# Patient Record
Sex: MALE | Race: WHITE | NOT HISPANIC OR LATINO | Employment: FULL TIME | URBAN - METROPOLITAN AREA
[De-identification: names, ages, dates, MRNs, and addresses within clinical notes are randomized per-mention and may not be internally consistent; named-entity substitution may affect disease eponyms.]

---

## 2024-06-18 ENCOUNTER — TELEPHONE (OUTPATIENT)
Dept: SPORTS MEDICINE | Facility: CLINIC | Age: 59
End: 2024-06-18

## 2024-06-18 ENCOUNTER — OFFICE VISIT (OUTPATIENT)
Dept: SPORTS MEDICINE | Facility: CLINIC | Age: 59
End: 2024-06-18
Payer: COMMERCIAL

## 2024-06-18 ENCOUNTER — HOSPITAL ENCOUNTER (OUTPATIENT)
Facility: HOSPITAL | Age: 59
Discharge: HOME | End: 2024-06-18
Admitting: STUDENT IN AN ORGANIZED HEALTH CARE EDUCATION/TRAINING PROGRAM
Payer: COMMERCIAL

## 2024-06-18 VITALS
BODY MASS INDEX: 30.36 KG/M2 | HEIGHT: 69 IN | OXYGEN SATURATION: 95 % | SYSTOLIC BLOOD PRESSURE: 132 MMHG | TEMPERATURE: 97.8 F | WEIGHT: 205 LBS | DIASTOLIC BLOOD PRESSURE: 85 MMHG | HEART RATE: 79 BPM

## 2024-06-18 DIAGNOSIS — S86.011A ACHILLES RUPTURE, RIGHT, INITIAL ENCOUNTER: Primary | ICD-10-CM

## 2024-06-18 DIAGNOSIS — S86.011A ACHILLES RUPTURE, RIGHT, INITIAL ENCOUNTER: ICD-10-CM

## 2024-06-18 PROCEDURE — 99203 OFFICE O/P NEW LOW 30 MIN: CPT | Performed by: STUDENT IN AN ORGANIZED HEALTH CARE EDUCATION/TRAINING PROGRAM

## 2024-06-18 PROCEDURE — 73718 MRI LOWER EXTREMITY W/O DYE: CPT

## 2024-06-18 RX ORDER — RIVAROXABAN 10 MG/1
10 TABLET, FILM COATED ORAL
COMMUNITY
Start: 2024-05-17

## 2024-06-18 NOTE — PROGRESS NOTES
"Chief Complaint   Patient presents with    Right Ankle - Initial Evaluation    Pain     Right calf       History of Present Illness  Zhao is a 58 y.o. 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   Pain is currently rated 5/10 and described as a throbbing. Feels like \"a pulled muscle\".   Pain is located on the posterior aspect of the distal calf, but no pain \"in the Achilles\".   Admits to associated stiffness and giving way stating he doesn't feel stable on it.   Denies any numbness or tingling.   Pain worsens with activity, such as walking and stairs.   Has been managing symptoms with ice and took ibuprofen 400 mg after it happened. (Of note, he is on chronic Xarelto for history of PE and factor V liden.)  Denies any previous injury or occurrence.   History of previous L ACL repair in 2008.       The following data was reviewed by: Elsi Mcrae DO on 06/18/2024:  Data reviewed :        Lab Results   Component Value Date    BUN 19 12/15/2022    CREATININE 1.07 12/15/2022    BCR 17.9 12/15/2022    K 4.2 12/15/2022    CO2 28 12/15/2022    CALCIUM 9.1 12/15/2022    ALBUMIN 4.3 05/17/2022    BILITOT 0.4 05/17/2022    AST 26 05/17/2022    ALT 41 05/17/2022         Review of Systems   All other systems reviewed and are negative.      /85 (BP Location: Right arm, Patient Position: Sitting, Cuff Size: Adult)   Pulse 79   Temp 97.8 °F (36.6 °C) (Infrared)   Ht 175.3 cm (69\")   Wt 93 kg (205 lb)   SpO2 95%   BMI 30.27 kg/m²        Physical Exam  Vital signs reviewed.   General: Well developed, well nourished; No acute distress.  Eyes: conjunctiva clear; pupils equally round and reactive  ENT: external ears and nose atraumatic; hearing normal  CV: no peripheral edema, 2+ distal pulses  Resp: normal respiratory " effort, no use of accessory muscles  Skin: normal color and pigmentation; no rashes or wounds; normal turgor  Psych: alert and oriented; mood and affect appropriate; recent and remote memory intact  Neuro: sensation to light touch intact    MSK Exam:  The right ankle is without obvious signs of acute bony deformity, swelling, erythema or ecchymosis.  There is slight increase in circumference throughout the left lower extremity compared to the right, though he states this is chronic since his DVT on the left many years ago.  No area of focal swelling or bruising along the right lower leg.  The distal Achilles feels intact. However, more proximally at the site of the proximal Achilles/musculotendinous junction there is a palpable defect with loss of muscle/tendon tension. Area feels soft with local swelling/hematoma and there is associated tenderness in this area as well.  There is also tenderness that continues proximally along the medial gastrocnemius.  No significant tenderness of the lateral head of the gastroc.  Positive Wong's test.      Assessment and Plan  Diagnoses and all orders for this visit:    1. Achilles rupture, right, initial encounter (Primary)  -     MRI Tibia Fibula Right Without Contrast; Future    Zhao is a 58 y.o. 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 History and exam consistent with proximal Achilles rupture. I recommended MRI to further evaluate the extent of the injury, however based on physical exam findings it is likely that this is a complete rupture.  We discussed surgical and conservative treatment options, including general recovery protocols and risks of rerupture.  Given that he is very active, he is leaning towards surgical repair if necessary.  We will await MRI results and  refer to orthopedic surgery if appropriate.  For now, I recommended supportive management.  He was provided an Ace wrap for gentle compression.  I recommended ice and elevation above the level of his heart to help with pain and swelling.  He should remain nonweightbearing with use of the crutches, which were adjusted to an appropriate height.  He should avoid NSAIDs given his medical history and chronic use of Xarelto, but may use Tylenol 1000 mg every 8 hours as needed.  We discussed concerning signs and symptoms that would warrant more urgent follow-up. We will follow-up after MRI additional treatment recommendations accordinglyis obtained and make . All of his questions were answered and he is agreeable with the plan.    Dictated utilizing Dragon dictation.

## 2024-06-18 NOTE — LETTER
June 18, 2024     Patient: Zhao Yeh   YOB: 1965   Date of Visit: 6/18/2024       To Whom It May Concern:    It is my medical opinion that Zhao Yeh may return to work on 6/18/2024 . However, he sustained a fairly significant injury that will require additional time away from work as it continues to be evaluated. Please excuse him from any time away. He will be following-up either with myself or other providers and we will make additional recommendations accordingly.            Sincerely,        Elsi Mcrae DO    CC: No Recipients

## 2024-06-18 NOTE — TELEPHONE ENCOUNTER
Hub staff attempted to follow warm transfer process and was unsuccessful     Caller: PATIENT    Relationship to patient: SELF    Best call back number: [phone]    Patient is needing: PATIENT WAS RETURNING A MISSED CALL FROM YOUR OFFICE REGARDING HIS MRI APPT TODAY. PATIENT WAS CALLING TO LET YOUR OFFICE KNOW HE WILL BE AT HIS MRI APPT. THANK YOU!

## 2024-06-20 ENCOUNTER — TELEPHONE (OUTPATIENT)
Dept: ORTHOPEDIC SURGERY | Facility: CLINIC | Age: 59
End: 2024-06-20
Payer: COMMERCIAL

## 2024-06-20 NOTE — TELEPHONE ENCOUNTER
Received a referral from Dr. Mcrae on patient for right achilles rupture.      Called pt and scheduled him with Dr. Ty for Monday, 6/24 @ 9:30am. I did let patient know that Dr. Ty wanted him to continue with a boot, double heel lifts and to remain partial weightbearing only.    Patient does not have a boot or lifts so he is going to come in office to get fitted with those today and will see Dr. Ty on Monday.

## 2024-06-21 ENCOUNTER — PATIENT ROUNDING (BHMG ONLY) (OUTPATIENT)
Dept: SPORTS MEDICINE | Facility: CLINIC | Age: 59
End: 2024-06-21
Payer: COMMERCIAL

## 2024-06-21 NOTE — PROGRESS NOTES
June 21, 2024      A [employer] Message has been sent to the patient for PATIENT ROUNDING with [facility]

## 2024-06-24 ENCOUNTER — OFFICE VISIT (OUTPATIENT)
Dept: ORTHOPEDIC SURGERY | Facility: CLINIC | Age: 59
End: 2024-06-24
Payer: COMMERCIAL

## 2024-06-24 VITALS — WEIGHT: 205 LBS | TEMPERATURE: 97.1 F | HEIGHT: 69 IN | BODY MASS INDEX: 30.36 KG/M2

## 2024-06-24 DIAGNOSIS — S86.011A PARTIAL TEAR OF RIGHT ACHILLES TENDON, INITIAL ENCOUNTER: Primary | ICD-10-CM

## 2024-06-24 DIAGNOSIS — S86.111A RUPTURE OF RIGHT GASTROCNEMIUS TENDON, INITIAL ENCOUNTER: ICD-10-CM

## 2024-06-24 DIAGNOSIS — R52 PAIN: ICD-10-CM

## 2024-06-24 PROCEDURE — 99204 OFFICE O/P NEW MOD 45 MIN: CPT | Performed by: ORTHOPAEDIC SURGERY

## 2024-06-24 NOTE — PROGRESS NOTES
New Patient Complaint      Patient: Zhao Yeh  YOB: 1965 58 y.o. male  Medical Record Number: [id number]    Chief Complaints: I hurt my Achilles    History of Present Illness: Patient injured his right Achilles on 6/18/2024.  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  He was seen by Dr. Mcrae that day and sent for MRI for evaluation of Achilles rupture.        Patient is seen today at the request of Dr. Mcrae who has requested my opinion regarding etiology and treatment of this condition    HPI    Allergies: No Known Allergies    Medications:   Current Outpatient Medications on File Prior to Visit   Medication Sig    Xarelto 10 MG tablet Take 1 tablet by mouth Daily With Dinner.     No current facility-administered medications on file prior to visit.       Past Medical History:   Diagnosis Date    Acromioclavicular separation 2003    Arthritis of back 2010    Osgood-Schlatter's disease      Past Surgical History:   Procedure Laterality Date    HAND SURGERY  1980    Right wrist Cyst removal    KNEE SURGERY  2007    Left ACL reconstruction     Social History     Occupational History    Not on file   Tobacco Use    Smoking status: Never    Smokeless tobacco: Never   Vaping Use    Vaping status: Never Used   Substance and Sexual Activity    Alcohol use: Not Currently     Alcohol/week: 4.0 standard drinks of alcohol     Types: 4 Drinks containing 0.5 oz of alcohol per week    Drug use: Never    Sexual activity: Yes     Partners: Female     Birth control/protection: Vasectomy      Social History     Social History Narrative    Not on file     No family history on file.    Review of Systems: 14 point review of systems performed, positive pertinent findings identified in HPI. All remaining systems negative     Review of Systems      Physical Exam:   Vitals:    06/24/24 0927   Temp: 97.1 °F (36.2 °C)  "  Weight: 93 kg (205 lb)   Height: 175.3 cm (69\")   PainSc:   3     Physical Exam   Constitutional: pleasant, well developed   Eyes: sclera non icteric  Hearing : adequate for exam  Cardiovascular: palpable pulses in right foot, right calf/ thigh NT without sign of DVT  Respiratoy: breathing unlabored   Neurological: grossly sensate to LT throughout right LE  Psychiatric: oriented with normal mood and affect.   Lymphatic: No palpable popliteal lymphadenopathy right LE  Skin: intact throughout right leg/foot  Musculoskeletal: On exam he has moderate tenderness and some swelling to the right calf and proximal Achilles area.  There is tenderness to palpation in this area and some of a palpable defect over the proximal medial aspect of the Achilles.  Does have increased resting length on the right compared to the left and no heel motion with Wong testing.  Physical Exam  Ortho Exam    Radiology:      2 views 2 views of the right heel ordered evaluate pain reviewed and no prior x-rays available for comparison no obvious fracture about the right heel.  There is a mildly prominent superior calcaneal tuberosity but no obvious calcifications or fracture.    MRI films and report of the right tib-fib dated 6/18/2024 showed partial tear of the medial head of the gastroc tendon at the gastrocsoleus junction distally this as well as anterior gastroc aponeurosis and comes contiguous with a tear of the posterior soleus aponeurosis..  Also continues with a transverse tear of the Achilles tendon origin/myotendinous junction and the proximal Achilles tendon fibers are discontinuous.  The mid to distal Achilles tendon is intact distal to the soleus.  There is retraction medial head of the gastroc and soleus muscle.  Tenderness and.  Mild tenderness edema/hemorrhage    Assessment/Plan: 1.  Right medial gastroc tear with proximal Achilles tendon tear    We discussed operative and nonoperative treatment options and not sure if this " would be amenable to repair given its proximity and patient reports that he would like to be considered for surgery if at all possible and if so to have that done with the limited exposure technique that he has seen online    Reviewed with him that he may or may not be a candidate for surgical treatment but could be given the area of defect that he has that I do not have familiarity with the percutaneous technique.    Will refer him to see Dr. Lees for his evaluation regarding treatment of this.  I spoke with Dr. Lees's assistant Regi who is going to contact the patient about getting in to see them this week.  Patient was fitted with a boot with double heel lift may do partial weightbearing on this.  He was provided with copies of his x-rays as well as MRI report and Dr. Lees's name and number and referral was placed in [facility].  He will let me know is proceeding once he sees him.

## 2024-06-26 ENCOUNTER — TELEPHONE (OUTPATIENT)
Dept: ORTHOPEDIC SURGERY | Facility: CLINIC | Age: 59
End: 2024-06-26
Payer: COMMERCIAL

## 2024-06-27 ENCOUNTER — TRANSCRIBE ORDERS (OUTPATIENT)
Dept: ADMINISTRATIVE | Facility: HOSPITAL | Age: 59
End: 2024-06-27
Payer: COMMERCIAL

## 2024-06-27 ENCOUNTER — HOSPITAL ENCOUNTER (OUTPATIENT)
Dept: CARDIOLOGY | Facility: HOSPITAL | Age: 59
Discharge: HOME | End: 2024-06-27
Payer: COMMERCIAL

## 2024-06-27 ENCOUNTER — LAB (OUTPATIENT)
Dept: LAB | Facility: HOSPITAL | Age: 59
End: 2024-06-27
Payer: COMMERCIAL

## 2024-06-27 DIAGNOSIS — S86.091A OTHER SPECIFIED INJURY OF RIGHT ACHILLES TENDON, INITIAL ENCOUNTER: ICD-10-CM

## 2024-06-27 DIAGNOSIS — S86.091A OTHER SPECIFIED INJURY OF RIGHT ACHILLES TENDON, INITIAL ENCOUNTER: Primary | ICD-10-CM

## 2024-06-27 LAB
ANION GAP SERPL CALCULATED.3IONS-SCNC: 10 MMOL/L (ref 5–15)
BASOPHILS # BLD AUTO: 0.04 10*3/MM3 (ref 0–0.2)
BASOPHILS NFR BLD AUTO: 0.5 % (ref 0–1.5)
BUN SERPL-MCNC: 15 MG/DL (ref 6–20)
BUN/CREAT SERPL: 14.7 (ref 7–25)
CALCIUM SPEC-SCNC: 9.6 MG/DL (ref 8.6–10.5)
CHLORIDE SERPL-SCNC: 104 MMOL/L (ref 98–107)
CO2 SERPL-SCNC: 26 MMOL/L (ref 22–29)
CREAT SERPL-MCNC: 1.02 MG/DL (ref 0.76–1.27)
DEPRECATED RDW RBC AUTO: 41.6 FL (ref 37–54)
EGFRCR SERPLBLD CKD-EPI 2021: 85.7 ML/MIN/1.73
EOSINOPHIL # BLD AUTO: 0.07 10*3/MM3 (ref 0–0.4)
EOSINOPHIL NFR BLD AUTO: 0.9 % (ref 0.3–6.2)
ERYTHROCYTE [DISTWIDTH] IN BLOOD BY AUTOMATED COUNT: 13 % (ref 12.3–15.4)
GLUCOSE SERPL-MCNC: 90 MG/DL (ref 65–99)
HCT VFR BLD AUTO: 45.7 % (ref 37.5–51)
HGB BLD-MCNC: 15.2 G/DL (ref 13–17.7)
IMM GRANULOCYTES # BLD AUTO: 0.01 10*3/MM3 (ref 0–0.05)
IMM GRANULOCYTES NFR BLD AUTO: 0.1 % (ref 0–0.5)
LYMPHOCYTES # BLD AUTO: 2.98 10*3/MM3 (ref 0.7–3.1)
LYMPHOCYTES NFR BLD AUTO: 37.5 % (ref 19.6–45.3)
MCH RBC QN AUTO: 29.1 PG (ref 26.6–33)
MCHC RBC AUTO-ENTMCNC: 33.3 G/DL (ref 31.5–35.7)
MCV RBC AUTO: 87.5 FL (ref 79–97)
MONOCYTES # BLD AUTO: 0.67 10*3/MM3 (ref 0.1–0.9)
MONOCYTES NFR BLD AUTO: 8.4 % (ref 5–12)
NEUTROPHILS NFR BLD AUTO: 4.18 10*3/MM3 (ref 1.7–7)
NEUTROPHILS NFR BLD AUTO: 52.6 % (ref 42.7–76)
NRBC BLD AUTO-RTO: 0 /100 WBC (ref 0–0.2)
PLATELET # BLD AUTO: 171 10*3/MM3 (ref 140–450)
PMV BLD AUTO: 9.8 FL (ref 6–12)
POTASSIUM SERPL-SCNC: 4.2 MMOL/L (ref 3.5–5.2)
QT INTERVAL: 426 MS
QTC INTERVAL: 415 MS
RBC # BLD AUTO: 5.22 10*6/MM3 (ref 4.14–5.8)
SODIUM SERPL-SCNC: 140 MMOL/L (ref 136–145)
WBC NRBC COR # BLD AUTO: 7.95 10*3/MM3 (ref 3.4–10.8)

## 2024-06-27 PROCEDURE — 80048 BASIC METABOLIC PNL TOTAL CA: CPT

## 2024-06-27 PROCEDURE — 93005 ELECTROCARDIOGRAM TRACING: CPT | Performed by: ANESTHESIOLOGY

## 2024-06-27 PROCEDURE — 85025 COMPLETE CBC W/AUTO DIFF WBC: CPT

## 2024-06-27 PROCEDURE — 36415 COLL VENOUS BLD VENIPUNCTURE: CPT

## 2024-06-28 ENCOUNTER — LAB (OUTPATIENT)
Dept: LAB | Facility: HOSPITAL | Age: 59
End: 2024-06-28
Payer: COMMERCIAL

## 2024-06-28 ENCOUNTER — PATIENT ROUNDING (BHMG ONLY) (OUTPATIENT)
Dept: ORTHOPEDIC SURGERY | Facility: CLINIC | Age: 59
End: 2024-06-28
Payer: COMMERCIAL

## 2024-06-28 ENCOUNTER — CONSULT (OUTPATIENT)
Dept: ONCOLOGY | Facility: CLINIC | Age: 59
End: 2024-06-28
Payer: COMMERCIAL

## 2024-06-28 VITALS
HEART RATE: 57 BPM | SYSTOLIC BLOOD PRESSURE: 132 MMHG | TEMPERATURE: 97.6 F | BODY MASS INDEX: 31.36 KG/M2 | OXYGEN SATURATION: 98 % | HEIGHT: 69 IN | DIASTOLIC BLOOD PRESSURE: 76 MMHG | RESPIRATION RATE: 16 BRPM | WEIGHT: 211.7 LBS

## 2024-06-28 DIAGNOSIS — R79.89 ABNORMAL CBC: Primary | ICD-10-CM

## 2024-06-28 DIAGNOSIS — Z86.711 HISTORY OF PULMONARY EMBOLISM: Primary | ICD-10-CM

## 2024-06-28 DIAGNOSIS — D68.51 FACTOR V LEIDEN MUTATION: ICD-10-CM

## 2024-06-28 DIAGNOSIS — Z86.711 HISTORY OF PULMONARY EMBOLISM: ICD-10-CM

## 2024-06-28 LAB
BASOPHILS # BLD AUTO: 0.04 10*3/MM3 (ref 0–0.2)
BASOPHILS NFR BLD AUTO: 0.5 % (ref 0–1.5)
D DIMER PPP FEU-MCNC: 0.72 MCGFEU/ML (ref 0–0.57)
DEPRECATED RDW RBC AUTO: 39.7 FL (ref 37–54)
EOSINOPHIL # BLD AUTO: 0.08 10*3/MM3 (ref 0–0.4)
EOSINOPHIL NFR BLD AUTO: 1 % (ref 0.3–6.2)
ERYTHROCYTE [DISTWIDTH] IN BLOOD BY AUTOMATED COUNT: 12.6 % (ref 12.3–15.4)
HCT VFR BLD AUTO: 45.8 % (ref 37.5–51)
HGB BLD-MCNC: 15.8 G/DL (ref 13–17.7)
IMM GRANULOCYTES # BLD AUTO: 0.09 10*3/MM3 (ref 0–0.05)
IMM GRANULOCYTES NFR BLD AUTO: 1.1 % (ref 0–0.5)
LYMPHOCYTES # BLD AUTO: 2.57 10*3/MM3 (ref 0.7–3.1)
LYMPHOCYTES NFR BLD AUTO: 31.7 % (ref 19.6–45.3)
MCH RBC QN AUTO: 30 PG (ref 26.6–33)
MCHC RBC AUTO-ENTMCNC: 34.5 G/DL (ref 31.5–35.7)
MCV RBC AUTO: 86.9 FL (ref 79–97)
MONOCYTES # BLD AUTO: 0.66 10*3/MM3 (ref 0.1–0.9)
MONOCYTES NFR BLD AUTO: 8.1 % (ref 5–12)
NEUTROPHILS NFR BLD AUTO: 4.68 10*3/MM3 (ref 1.7–7)
NEUTROPHILS NFR BLD AUTO: 57.6 % (ref 42.7–76)
NRBC BLD AUTO-RTO: 0 /100 WBC (ref 0–0.2)
PLATELET # BLD AUTO: 145 10*3/MM3 (ref 140–450)
PMV BLD AUTO: 10.3 FL (ref 6–12)
RBC # BLD AUTO: 5.27 10*6/MM3 (ref 4.14–5.8)
WBC NRBC COR # BLD AUTO: 8.12 10*3/MM3 (ref 3.4–10.8)

## 2024-06-28 PROCEDURE — 85379 FIBRIN DEGRADATION QUANT: CPT | Performed by: INTERNAL MEDICINE

## 2024-06-28 PROCEDURE — 85025 COMPLETE CBC W/AUTO DIFF WBC: CPT

## 2024-06-28 NOTE — PROGRESS NOTES
[facility]    CONSULTING IN BLOOD DISORDERS & CANCER      REASON FOR CONSULTATION/CHIEF COMPLAINT:     Evaluation and management for history of DVT PE and factor V Leiden mutation                             REQUESTING PHYSICIAN: Max Lees Jr., MD  RECORDS OBTAINED:  Records of the patients history including those from the electronic medical record were reviewed and summarized in detail.    HISTORY OF PRESENT ILLNESS:    The patient is a 58 y.o. year old male with medical history significant for history of DVTs/PE and factor V Leiden mutation comes for perioperative anticoagulation evaluation and management prior to right Achilles tendon rupture repair surgery.    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  Further workup noted right Achilles tendon rupture.  He has seen Dr. Lees, orthopedic surgery who plans to perform surgical repair on 7/3/2024.    Patient has been referred to hematology clinic to formulate perioperative anticoagulation plan in view of history of DVT/PE and factor V Leiden mutation.    Patient states he had his first episode of LLE DVT after a long flight from [place] in a year 2005.  He took anticoagulation with warfarin for around 6 months and then stopped.  Patient had another episode of LLE DVT and PE after undergoing left ACL tear surgery in 2006.  Patient states he underwent hypercoagulable workup at that time and was found to have factor V Leiden mutation.  He is unsure about heterozygous vs homozygous mutation, although he was told it was a lower risk mutation.  He was resumed back on warfarin which she took until 2021 when seen by Dr. Orr, hematologist with [facility] who switched him to Xarelto.  The dose was changed to prophylactic dose of Xarelto 10 mg daily.    Patient has stayed on Xarelto 10 mg daily since then.  Denies any bleeding or excessive bruises.  Family history is significant for maternal grandmother and sister with  "history of blood clots.      Past Medical History:   Diagnosis Date    Acromioclavicular separation 2003    Arthritis of back 2010    Osgood-Schlatter's disease      Past Surgical History:   Procedure Laterality Date    HAND SURGERY  1980    Right wrist Cyst removal    KNEE SURGERY  2007    Left ACL reconstruction       MEDICATIONS    Current Outpatient Medications:     Xarelto 10 MG tablet, Take 1 tablet by mouth Daily With Dinner., Disp: , Rfl:     ALLERGIES:   No Known Allergies    SOCIAL HISTORY:       Social History     Socioeconomic History    Marital status:    Tobacco Use    Smoking status: Never    Smokeless tobacco: Never   Vaping Use    Vaping status: Never Used   Substance and Sexual Activity    Alcohol use: Not Currently     Alcohol/week: 4.0 standard drinks of alcohol     Types: 4 Drinks containing 0.5 oz of alcohol per week    Drug use: Never    Sexual activity: Yes     Partners: Female     Birth control/protection: Vasectomy         FAMILY HISTORY:  No family history on file.  As per HPI    REVIEW OF SYSTEMS:  As per HPI       Vitals:    06/28/24 1241   BP: 132/76   Pulse: 57   Resp: 16   Temp: 97.6 °F (36.4 °C)   TempSrc: Oral   SpO2: 98%   Weight: 96 kg (211 lb 11.2 oz)   Height: 175.3 cm (69.02\")   PainSc:   2   PainLoc: Foot         6/28/2024    12:45 PM   Current Status   ECOG score 1      PHYSICAL EXAM:    CONSTITUTIONAL:  Vital signs reviewed.  No distress, looks comfortable.  EYES:  Conjunctiva and lids unremarkable.   EARS,NOSE,MOUTH,THROAT:  Ears and nose appear unremarkable.  Lips, teeth, gums appear unremarkable.  RESPIRATORY:  Normal respiratory effort.  Lungs clear to auscultation bilaterally.  CARDIOVASCULAR:  Normal S1, S2.  No murmurs rubs or gallops.  No significant lower extremity edema.  GASTROINTESTINAL: Abdomen appears unremarkable.  Nondistended   LYMPHATIC:  No cervical, supraclavicular lymphadenopathy.  NEURO: AAO no focal deficits.  Appears to have equal strength all " 4 extremities.  MUSCULOSKELETAL:  Unremarkable digits/nails.  No cyanosis or clubbing.  No apparent joint deformities.  Right foot in a brace  SKIN:  Warm.  No rashes.  PSYCHIATRIC:  Normal judgment and insight.  Normal mood and affect.     RECENT LABS:        Lab on 06/28/2024   Component Date Value Ref Range Status    WBC 06/28/2024 8.12  3.40 - 10.80 10*3/mm3 Final    RBC 06/28/2024 5.27  4.14 - 5.80 10*6/mm3 Final    Hemoglobin 06/28/2024 15.8  13.0 - 17.7 g/dL Final    Hematocrit 06/28/2024 45.8  37.5 - 51.0 % Final    MCV 06/28/2024 86.9  79.0 - 97.0 fL Final    MCH 06/28/2024 30.0  26.6 - 33.0 pg Final    MCHC 06/28/2024 34.5  31.5 - 35.7 g/dL Final    RDW 06/28/2024 12.6  12.3 - 15.4 % Final    RDW-SD 06/28/2024 39.7  37.0 - 54.0 fl Final    MPV 06/28/2024 10.3  6.0 - 12.0 fL Final    Platelets 06/28/2024 145  140 - 450 10*3/mm3 Final    Neutrophil % 06/28/2024 57.6  42.7 - 76.0 % Final    Lymphocyte % 06/28/2024 31.7  19.6 - 45.3 % Final    Monocyte % 06/28/2024 8.1  5.0 - 12.0 % Final    Eosinophil % 06/28/2024 1.0  0.3 - 6.2 % Final    Basophil % 06/28/2024 0.5  0.0 - 1.5 % Final    Immature Grans % 06/28/2024 1.1 (H)  0.0 - 0.5 % Final    Neutrophils, Absolute 06/28/2024 4.68  1.70 - 7.00 10*3/mm3 Final    Lymphocytes, Absolute 06/28/2024 2.57  0.70 - 3.10 10*3/mm3 Final    Monocytes, Absolute 06/28/2024 0.66  0.10 - 0.90 10*3/mm3 Final    Eosinophils, Absolute 06/28/2024 0.08  0.00 - 0.40 10*3/mm3 Final    Basophils, Absolute 06/28/2024 0.04  0.00 - 0.20 10*3/mm3 Final    Immature Grans, Absolute 06/28/2024 0.09 (H)  0.00 - 0.05 10*3/mm3 Final    nRBC 06/28/2024 0.0  0.0 - 0.2 /100 WBC Final   Hospital Outpatient Visit on 06/27/2024   Component Date Value Ref Range Status    QT Interval 06/27/2024 426  ms Final    QTC Interval 06/27/2024 415  ms Final   Lab on 06/27/2024   Component Date Value Ref Range Status    Glucose 06/27/2024 90  65 - 99 mg/dL Final    BUN 06/27/2024 15  6 - 20 mg/dL Final     Creatinine 06/27/2024 1.02  0.76 - 1.27 mg/dL Final    Sodium 06/27/2024 140  136 - 145 mmol/L Final    Potassium 06/27/2024 4.2  3.5 - 5.2 mmol/L Final    Chloride 06/27/2024 104  98 - 107 mmol/L Final    CO2 06/27/2024 26.0  22.0 - 29.0 mmol/L Final    Calcium 06/27/2024 9.6  8.6 - 10.5 mg/dL Final    BUN/Creatinine Ratio 06/27/2024 14.7  7.0 - 25.0 Final    Anion Gap 06/27/2024 10.0  5.0 - 15.0 mmol/L Final    eGFR 06/27/2024 85.7  >60.0 mL/min/1.73 Final    WBC 06/27/2024 7.95  3.40 - 10.80 10*3/mm3 Final    RBC 06/27/2024 5.22  4.14 - 5.80 10*6/mm3 Final    Hemoglobin 06/27/2024 15.2  13.0 - 17.7 g/dL Final    Hematocrit 06/27/2024 45.7  37.5 - 51.0 % Final    MCV 06/27/2024 87.5  79.0 - 97.0 fL Final    MCH 06/27/2024 29.1  26.6 - 33.0 pg Final    MCHC 06/27/2024 33.3  31.5 - 35.7 g/dL Final    RDW 06/27/2024 13.0  12.3 - 15.4 % Final    RDW-SD 06/27/2024 41.6  37.0 - 54.0 fl Final    MPV 06/27/2024 9.8  6.0 - 12.0 fL Final    Platelets 06/27/2024 171  140 - 450 10*3/mm3 Final    Neutrophil % 06/27/2024 52.6  42.7 - 76.0 % Final    Lymphocyte % 06/27/2024 37.5  19.6 - 45.3 % Final    Monocyte % 06/27/2024 8.4  5.0 - 12.0 % Final    Eosinophil % 06/27/2024 0.9  0.3 - 6.2 % Final    Basophil % 06/27/2024 0.5  0.0 - 1.5 % Final    Immature Grans % 06/27/2024 0.1  0.0 - 0.5 % Final    Neutrophils, Absolute 06/27/2024 4.18  1.70 - 7.00 10*3/mm3 Final    Lymphocytes, Absolute 06/27/2024 2.98  0.70 - 3.10 10*3/mm3 Final    Monocytes, Absolute 06/27/2024 0.67  0.10 - 0.90 10*3/mm3 Final    Eosinophils, Absolute 06/27/2024 0.07  0.00 - 0.40 10*3/mm3 Final    Basophils, Absolute 06/27/2024 0.04  0.00 - 0.20 10*3/mm3 Final    Immature Grans, Absolute 06/27/2024 0.01  0.00 - 0.05 10*3/mm3 Final    nRBC 06/27/2024 0.0  0.0 - 0.2 /100 WBC Final       ASSESSMENT:  Patient is a pleasant 58-year-old male with medical history significant for history of DVTs/PE and factor V Leiden mutation comes for perioperative anticoagulation  evaluation and management prior to right Achilles tendon rupture repair surgery.    # History of DVT/PE in the setting of factor V Leiden (? heterozygous) mutation:  Patient states he had his first episode of LLE DVT after a long flight from [place] in a year 2005.  He took anticoagulation with warfarin for around 6 months and then stopped.    Patient had another episode of LLE DVT and PE after undergoing left ACL tear surgery in 2006.  Patient states he underwent hypercoagulable workup at that time and was found to have factor V Leiden mutation.  He is unsure about heterozygous vs homozygous mutation, although he was told it was a lower risk mutation.   He was resumed back on warfarin which she took until 2021 when seen by Dr. Orr, hematologist with [facility] who switched him to Xarelto 10 mg daily.  Patient has stayed on Xarelto 10 mg daily since then.  Denies any bleeding or excessive bruises.  Family history is significant for maternal grandmother and sister with history of blood clots.  No current symptoms of DVT/PE.  Plan made to continue Xarelto for now    # Perioperative anticoagulation management:  Given recurrent DVTs/PE and underlying factor V Leiden mutation, he is at high risk of recurrent VTE's.  Advised patient to increase the dose of Xarelto to 20 mg daily and stop 3 days prior to surgery.  He does not need bridging anticoagulation as last episode of thrombosis was more than 15 years ago.  Patient should resume Xarelto 20 mg daily once cleared to start anticoagulation by his surgical team.  Will see him back in 4 weeks for reevaluation.  If does well, consider reducing the dose back to Xarelto 10 mg daily.    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  Further workup noted right Achilles tendon rupture.    He has seen Dr. Lees, orthopedic surgery who plans to perform surgical repair on 7/3/2024.  Anticoagulation  management as above    PLAN:   - Increase the dose of Xarelto to 20 mg daily and stop 3 days prior to surgery.    - He does not need bridging anticoagulation as last episode of thrombosis was more than 15 years ago.  - Patient should resume Xarelto 20 mg daily once cleared to start anticoagulation by his surgical team.  -Check D-dimer today  -Follow-up in 4 weeks or sooner if needed    Orders Placed This Encounter   Procedures    D-dimer, Quantitative     Standing Status:   Future     Number of Occurrences:   1     Standing Expiration Date:   6/28/2025     Order Specific Question:   Release to patient     Answer:   Routine Release [[id number]]    D-dimer, Quantitative     Standing Status:   Future     Standing Expiration Date:   6/28/2025     Order Specific Question:   Release to patient     Answer:   Routine Release [[id number]]    CBC & Differential     Standing Status:   Future     Standing Expiration Date:   6/28/2025     Order Specific Question:   Manual Differential     Answer:   No     Order Specific Question:   Release to patient     Answer:   Routine Release [[id number]]   Total time spent during this patient encounter is 65 minutes. The total time spent with the patient includes: preparing to see the patient by reviewing of tests, prior notes or other relevant information, performing appropriate independent examination & evaluation, counseling, ordering of medications, tests or procedures, documenting clinic information in the electronic medical records or other health records, independently interpreting results of tests and communicating the results to the patient/family or caregiver.

## 2024-06-28 NOTE — PROGRESS NOTES
A [employer] Message has been sent to the patient for PATIENT ROUNDING with [facility]

## 2024-07-01 ENCOUNTER — PATIENT ROUNDING (BHMG ONLY) (OUTPATIENT)
Dept: ONCOLOGY | Facility: CLINIC | Age: 59
End: 2024-07-01
Payer: COMMERCIAL

## 2024-07-01 ENCOUNTER — PATIENT MESSAGE (OUTPATIENT)
Dept: ONCOLOGY | Facility: CLINIC | Age: 59
End: 2024-07-01
Payer: COMMERCIAL

## 2024-07-05 ENCOUNTER — TELEPHONE (OUTPATIENT)
Dept: ONCOLOGY | Facility: CLINIC | Age: 59
End: 2024-07-05
Payer: COMMERCIAL

## 2024-07-05 NOTE — TELEPHONE ENCOUNTER
Caller: Zhao Yeh    Relationship: Self    Best call back number: [phone]    What is the best time to reach you: ANYTIME    Who are you requesting to speak with (clinical staff, provider,  specific staff member): CLINICAL    What was the call regarding: PATIENT NEEDS FOR YOU TO SEND A NEW SCRIPT FOR XARELTO HE IS SUPPOSE TO TAKE 20MG FOR 4 WEEKS.    SEND TO: [address]    CALL TO DISCUSS

## 2024-07-05 NOTE — TELEPHONE ENCOUNTER
Caller: Zhao Yeh    Relationship: Self    Best call back number: [phone]     What is the best time to reach you: ASAP    Who are you requesting to speak with (clinical staff, provider,  specific staff member): TARIQ      What was the call regarding: PATIENT IS RETURNING TARIQ'S CALL ABOUT THE XARELTO, HUB UNABLE TO WARM TRANSFER.  PLEASE CALL PATIENT BACK WHEN AVAILABLE. PATIENT SAYS HE HAS ALREADY HAD HIS SURGERY, ON JULY 3RD.

## 2024-08-01 ENCOUNTER — OFFICE VISIT (OUTPATIENT)
Dept: ONCOLOGY | Facility: CLINIC | Age: 59
End: 2024-08-01
Payer: COMMERCIAL

## 2024-08-01 ENCOUNTER — LAB (OUTPATIENT)
Dept: LAB | Facility: HOSPITAL | Age: 59
End: 2024-08-01
Payer: COMMERCIAL

## 2024-08-01 VITALS
OXYGEN SATURATION: 97 % | HEIGHT: 69 IN | RESPIRATION RATE: 17 BRPM | SYSTOLIC BLOOD PRESSURE: 140 MMHG | BODY MASS INDEX: 30.21 KG/M2 | TEMPERATURE: 97.8 F | HEART RATE: 84 BPM | DIASTOLIC BLOOD PRESSURE: 80 MMHG | WEIGHT: 204 LBS

## 2024-08-01 DIAGNOSIS — Z86.711 HISTORY OF PULMONARY EMBOLISM: ICD-10-CM

## 2024-08-01 DIAGNOSIS — D68.51 FACTOR V LEIDEN MUTATION: ICD-10-CM

## 2024-08-01 DIAGNOSIS — Z86.711 HISTORY OF PULMONARY EMBOLISM: Primary | ICD-10-CM

## 2024-08-01 LAB
BASOPHILS # BLD AUTO: 0.04 10*3/MM3 (ref 0–0.2)
BASOPHILS NFR BLD AUTO: 0.6 % (ref 0–1.5)
D DIMER PPP FEU-MCNC: 19.28 MCGFEU/ML (ref 0–0.57)
DEPRECATED RDW RBC AUTO: 40.5 FL (ref 37–54)
EOSINOPHIL # BLD AUTO: 0.11 10*3/MM3 (ref 0–0.4)
EOSINOPHIL NFR BLD AUTO: 1.5 % (ref 0.3–6.2)
ERYTHROCYTE [DISTWIDTH] IN BLOOD BY AUTOMATED COUNT: 12.7 % (ref 12.3–15.4)
HCT VFR BLD AUTO: 44.5 % (ref 37.5–51)
HGB BLD-MCNC: 15.3 G/DL (ref 13–17.7)
IMM GRANULOCYTES # BLD AUTO: 0.01 10*3/MM3 (ref 0–0.05)
IMM GRANULOCYTES NFR BLD AUTO: 0.1 % (ref 0–0.5)
LYMPHOCYTES # BLD AUTO: 2.63 10*3/MM3 (ref 0.7–3.1)
LYMPHOCYTES NFR BLD AUTO: 36.2 % (ref 19.6–45.3)
MCH RBC QN AUTO: 30.1 PG (ref 26.6–33)
MCHC RBC AUTO-ENTMCNC: 34.4 G/DL (ref 31.5–35.7)
MCV RBC AUTO: 87.4 FL (ref 79–97)
MONOCYTES # BLD AUTO: 0.56 10*3/MM3 (ref 0.1–0.9)
MONOCYTES NFR BLD AUTO: 7.7 % (ref 5–12)
NEUTROPHILS NFR BLD AUTO: 3.91 10*3/MM3 (ref 1.7–7)
NEUTROPHILS NFR BLD AUTO: 53.9 % (ref 42.7–76)
NRBC BLD AUTO-RTO: 0 /100 WBC (ref 0–0.2)
PLATELET # BLD AUTO: 151 10*3/MM3 (ref 140–450)
PMV BLD AUTO: 10.5 FL (ref 6–12)
RBC # BLD AUTO: 5.09 10*6/MM3 (ref 4.14–5.8)
WBC NRBC COR # BLD AUTO: 7.26 10*3/MM3 (ref 3.4–10.8)

## 2024-08-01 PROCEDURE — 85379 FIBRIN DEGRADATION QUANT: CPT | Performed by: INTERNAL MEDICINE

## 2024-08-01 PROCEDURE — 36415 COLL VENOUS BLD VENIPUNCTURE: CPT

## 2024-08-01 PROCEDURE — 85025 COMPLETE CBC W/AUTO DIFF WBC: CPT

## 2024-08-01 NOTE — PROGRESS NOTES
[facility]    CONSULTING IN BLOOD DISORDERS & CANCER      REASON FOR CONSULTATION/CHIEF COMPLAINT:     Evaluation and management for history of DVT PE and factor V Leiden mutation                             REQUESTING PHYSICIAN: No ref. provider found  RECORDS OBTAINED:  Records of the patients history including those from the electronic medical record were reviewed and summarized in detail.    HISTORY OF PRESENT ILLNESS:    The patient is a 58 y.o. year old male with medical history significant for history of DVTs/PE and factor V Leiden mutation comes for perioperative anticoagulation evaluation and management prior to right Achilles tendon rupture repair surgery.    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  Further workup noted right Achilles tendon rupture.  He has seen Dr. Lees, orthopedic surgery who plans to perform surgical repair on 7/3/2024.    Patient has been referred to hematology clinic to formulate perioperative anticoagulation plan in view of history of DVT/PE and factor V Leiden mutation.    Patient states he had his first episode of LLE DVT after a long flight from [place] in a year 2005.  He took anticoagulation with warfarin for around 6 months and then stopped.  Patient had another episode of LLE DVT and PE after undergoing left ACL tear surgery in 2006.  Patient states he underwent hypercoagulable workup at that time and was found to have factor V Leiden mutation.  He is unsure about heterozygous vs homozygous mutation, although he was told it was a lower risk mutation.  He was resumed back on warfarin which she took until 2021 when seen by Dr. Orr, hematologist with [facility] who switched him to Xarelto.  The dose was changed to prophylactic dose of Xarelto 10 mg daily.    Patient has stayed on Xarelto 10 mg daily since then.  Denies any bleeding or excessive bruises.  Family history is significant for maternal grandmother and sister with  "history of blood clots.    INTERIM HISTORY:  Patient returns to the clinic for a follow-up visit.      July 2024: Patient underwent right Achilles tendon repair surgery on 7/3/2024.  Uneventful postoperative course.  Patient had increased the dose of Xarelto to 20 mg daily prior to surgery and stopped 3 days before.  He resumed Xarelto 20 mg daily a day after surgery.  He has stayed on the same dose for now.  Denies any bleeding or bruises.  Right lower extremity still in a cast.  Planning to get cast removed next week.  After that, he will get a boot and then a shoe.  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    Past Medical History:   Diagnosis Date    Acromioclavicular separation 2003    Arthritis of back 2010    Osgood-Schlatter's disease      Past Surgical History:   Procedure Laterality Date    HAND SURGERY  1980    Right wrist Cyst removal    KNEE SURGERY  2007    Left ACL reconstruction       MEDICATIONS    Current Outpatient Medications:     rivaroxaban (XARELTO) 20 MG tablet, Take 1 tablet by mouth Daily., Disp: 60 tablet, Rfl: 0    ALLERGIES:   No Known Allergies    SOCIAL HISTORY:       Social History     Socioeconomic History    Marital status:    Tobacco Use    Smoking status: Never    Smokeless tobacco: Never   Vaping Use    Vaping status: Never Used   Substance and Sexual Activity    Alcohol use: Not Currently     Alcohol/week: 4.0 standard drinks of alcohol     Types: 4 Drinks containing 0.5 oz of alcohol per week    Drug use: Never    Sexual activity: Yes     Partners: Female     Birth control/protection: Vasectomy         FAMILY HISTORY:  No family history on file.  As per HPI    REVIEW OF SYSTEMS:  As per HPI       Vitals:    08/01/24 1142   BP: 140/80   Pulse: 84   Resp: 17   Temp: 97.8 °F (36.6 °C)   TempSrc: Oral   SpO2: 97%   Weight: 92.5 kg (204 lb)   Height: 175.3 cm (69.02\")   PainSc: 0-No pain         8/1/2024    11:43 AM   Current Status   ECOG score 0      " PHYSICAL EXAM:    CONSTITUTIONAL:  Vital signs reviewed.  No distress, looks comfortable.  EYES:  Conjunctiva and lids unremarkable.   EARS,NOSE,MOUTH,THROAT:  Ears and nose appear unremarkable.  Lips, teeth, gums appear unremarkable.  RESPIRATORY:  Normal respiratory effort.  Lungs clear to auscultation bilaterally.  CARDIOVASCULAR:  Normal S1, S2.  No murmurs rubs or gallops.  No significant lower extremity edema.  GASTROINTESTINAL: Abdomen appears unremarkable.  Nondistended   LYMPHATIC:  No cervical, supraclavicular lymphadenopathy.  NEURO: AAO no focal deficits.  Appears to have equal strength all 4 extremities.  MUSCULOSKELETAL:  Unremarkable digits/nails.  No cyanosis or clubbing.  No apparent joint deformities.  Right foot in a cast  SKIN:  Warm.  No rashes.  PSYCHIATRIC:  Normal judgment and insight.  Normal mood and affect.     RECENT LABS:        Lab on 08/01/2024   Component Date Value Ref Range Status    D-Dimer, Quantitative 08/01/2024 19.28 (H)  0.00 - 0.57 MCGFEU/mL Final    WBC 08/01/2024 7.26  3.40 - 10.80 10*3/mm3 Final    RBC 08/01/2024 5.09  4.14 - 5.80 10*6/mm3 Final    Hemoglobin 08/01/2024 15.3  13.0 - 17.7 g/dL Final    Hematocrit 08/01/2024 44.5  37.5 - 51.0 % Final    MCV 08/01/2024 87.4  79.0 - 97.0 fL Final    MCH 08/01/2024 30.1  26.6 - 33.0 pg Final    MCHC 08/01/2024 34.4  31.5 - 35.7 g/dL Final    RDW 08/01/2024 12.7  12.3 - 15.4 % Final    RDW-SD 08/01/2024 40.5  37.0 - 54.0 fl Final    MPV 08/01/2024 10.5  6.0 - 12.0 fL Final    Platelets 08/01/2024 151  140 - 450 10*3/mm3 Final    Neutrophil % 08/01/2024 53.9  42.7 - 76.0 % Final    Lymphocyte % 08/01/2024 36.2  19.6 - 45.3 % Final    Monocyte % 08/01/2024 7.7  5.0 - 12.0 % Final    Eosinophil % 08/01/2024 1.5  0.3 - 6.2 % Final    Basophil % 08/01/2024 0.6  0.0 - 1.5 % Final    Immature Grans % 08/01/2024 0.1  0.0 - 0.5 % Final    Neutrophils, Absolute 08/01/2024 3.91  1.70 - 7.00 10*3/mm3 Final    Lymphocytes, Absolute  08/01/2024 2.63  0.70 - 3.10 10*3/mm3 Final    Monocytes, Absolute 08/01/2024 0.56  0.10 - 0.90 10*3/mm3 Final    Eosinophils, Absolute 08/01/2024 0.11  0.00 - 0.40 10*3/mm3 Final    Basophils, Absolute 08/01/2024 0.04  0.00 - 0.20 10*3/mm3 Final    Immature Grans, Absolute 08/01/2024 0.01  0.00 - 0.05 10*3/mm3 Final    nRBC 08/01/2024 0.0  0.0 - 0.2 /100 WBC Final       ASSESSMENT:  Patient is a pleasant 58-year-old male with medical history significant for history of DVTs/PE and factor V Leiden mutation comes for perioperative anticoagulation evaluation and management prior to right Achilles tendon rupture repair surgery.    # History of DVT/PE in the setting of factor V Leiden (? heterozygous) mutation:  Patient states he had his first episode of LLE DVT after a long flight from [place] in year 2005.  He took anticoagulation with warfarin for around 6 months and then stopped.    Patient had another episode of LLE DVT and PE after undergoing left ACL tear surgery in 2006.  Patient states he underwent hypercoagulable workup at that time and was found to have factor V Leiden mutation.  He is unsure about heterozygous vs homozygous mutation, although he was told it was a lower risk mutation.   He was resumed back on warfarin which she took until 2021 when seen by Dr. Orr, hematologist with [facility] who switched him to Xarelto 10 mg daily.  Patient has stayed on Xarelto 10 mg daily since then.  Denies any bleeding or excessive bruises.  Family history is significant for maternal grandmother and sister with history of blood clots.  No current symptoms of DVT/PE.    7/3/2024: Patient underwent right Achilles tendon repair.  Uneventful postoperative course.  8/1/2024: Patient had increased the dose of Xarelto to 20 mg daily immediately before and after the right Achilles tendon repair surgery.  Tolerating it well.  No bleeding/bruises.  D-dimer levels from today elevated at 19.28.  This is likely due to recent  surgery, however due to high risk for VTE's-will continue Xarelto 20 mg daily for another 2 months (until end of September 2024).  Reevaluate after that.  Patient is agreeable    # Perioperative anticoagulation management:  Given recurrent DVTs/PE and underlying factor V Leiden mutation, he is at high risk of recurrent VTE's.  Patient took increased dose of Xarelto 20 mg daily perioperatively.  No bridge anticoagulation.  8/1/2024: Continue Xarelto 20 mg a daily for now.    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  Further workup noted right Achilles tendon rupture.    He was seen Dr. Lees, orthopedic surgery who performed surgical repair on 7/3/2024.  Anticoagulation management as above    PLAN:   -Continue Xarelto 20 mg daily  -Check D-dimer at follow-up  -Postoperative care per orthopedic surgery  -Follow-up in 8 weeks or sooner if needed    Orders Placed This Encounter   Procedures    Comprehensive Metabolic Panel     Standing Status:   Future     Standing Expiration Date:   8/1/2025     Order Specific Question:   Release to patient     Answer:   Routine Release [[id number]]    D-dimer, Quantitative     Standing Status:   Future     Standing Expiration Date:   8/1/2025     Order Specific Question:   Release to patient     Answer:   Routine Release [[id number]]    CBC & Differential     Standing Status:   Future     Standing Expiration Date:   8/1/2025     Order Specific Question:   Manual Differential     Answer:   No     Order Specific Question:   Release to patient     Answer:   Routine Release [[id number]]   Total time spent during this patient encounter is 43 minutes. The total time spent with the patient includes: preparing to see the patient by reviewing of tests, prior notes or other relevant information, performing appropriate independent examination & evaluation, counseling, ordering of medications, tests or procedures,  documenting clinic information in the electronic medical records or other health records, independently interpreting results of tests and communicating the results to the patient/family or caregiver.

## 2024-09-17 ENCOUNTER — TELEPHONE (OUTPATIENT)
Dept: ONCOLOGY | Facility: CLINIC | Age: 59
End: 2024-09-17
Payer: COMMERCIAL

## 2024-09-18 ENCOUNTER — TELEPHONE (OUTPATIENT)
Dept: ONCOLOGY | Facility: CLINIC | Age: 59
End: 2024-09-18

## 2024-09-18 NOTE — TELEPHONE ENCOUNTER
Caller: Zhao Yeh    Relationship: Self    Best call back number: [phone]    What is the best time to reach you: ANYTIME    Who are you requesting to speak with (clinical staff, provider,  specific staff member): SCHEDULING     What was the call regarding: PT RETURNING MISSED CALL FROM ROBER FOR RESCHEDULING 9-27

## 2024-10-01 ENCOUNTER — LAB (OUTPATIENT)
Dept: LAB | Facility: HOSPITAL | Age: 59
End: 2024-10-01
Payer: COMMERCIAL

## 2024-10-01 ENCOUNTER — OFFICE VISIT (OUTPATIENT)
Dept: ONCOLOGY | Facility: CLINIC | Age: 59
End: 2024-10-01
Payer: COMMERCIAL

## 2024-10-01 VITALS
BODY MASS INDEX: 31.5 KG/M2 | DIASTOLIC BLOOD PRESSURE: 86 MMHG | OXYGEN SATURATION: 97 % | RESPIRATION RATE: 16 BRPM | TEMPERATURE: 98.1 F | HEIGHT: 69 IN | HEART RATE: 59 BPM | SYSTOLIC BLOOD PRESSURE: 153 MMHG | WEIGHT: 212.7 LBS

## 2024-10-01 DIAGNOSIS — Z86.711 HISTORY OF PULMONARY EMBOLISM: ICD-10-CM

## 2024-10-01 DIAGNOSIS — D68.51 FACTOR V LEIDEN MUTATION: ICD-10-CM

## 2024-10-01 DIAGNOSIS — Z86.711 HISTORY OF PULMONARY EMBOLISM: Primary | ICD-10-CM

## 2024-10-01 LAB
ALBUMIN SERPL-MCNC: 4.1 G/DL (ref 3.5–5.2)
ALBUMIN/GLOB SERPL: 1.5 G/DL
ALP SERPL-CCNC: 68 U/L (ref 39–117)
ALT SERPL W P-5'-P-CCNC: 24 U/L (ref 1–41)
ANION GAP SERPL CALCULATED.3IONS-SCNC: 11 MMOL/L (ref 5–15)
AST SERPL-CCNC: 23 U/L (ref 1–40)
BASOPHILS # BLD AUTO: 0.05 10*3/MM3 (ref 0–0.2)
BASOPHILS NFR BLD AUTO: 0.6 % (ref 0–1.5)
BILIRUB SERPL-MCNC: 0.2 MG/DL (ref 0–1.2)
BUN SERPL-MCNC: 16 MG/DL (ref 6–20)
BUN/CREAT SERPL: 13.9 (ref 7–25)
CALCIUM SPEC-SCNC: 9.2 MG/DL (ref 8.6–10.5)
CHLORIDE SERPL-SCNC: 102 MMOL/L (ref 98–107)
CO2 SERPL-SCNC: 27 MMOL/L (ref 22–29)
CREAT SERPL-MCNC: 1.15 MG/DL (ref 0.76–1.27)
D DIMER PPP FEU-MCNC: 0.62 MCGFEU/ML (ref 0–0.58)
DEPRECATED RDW RBC AUTO: 41.1 FL (ref 37–54)
EGFRCR SERPLBLD CKD-EPI 2021: 73.8 ML/MIN/1.73
EOSINOPHIL # BLD AUTO: 0.17 10*3/MM3 (ref 0–0.4)
EOSINOPHIL NFR BLD AUTO: 2 % (ref 0.3–6.2)
ERYTHROCYTE [DISTWIDTH] IN BLOOD BY AUTOMATED COUNT: 12.6 % (ref 12.3–15.4)
GLOBULIN UR ELPH-MCNC: 2.8 GM/DL
GLUCOSE SERPL-MCNC: 114 MG/DL (ref 65–99)
HCT VFR BLD AUTO: 44.5 % (ref 37.5–51)
HGB BLD-MCNC: 14.7 G/DL (ref 13–17.7)
IMM GRANULOCYTES # BLD AUTO: 0.02 10*3/MM3 (ref 0–0.05)
IMM GRANULOCYTES NFR BLD AUTO: 0.2 % (ref 0–0.5)
LYMPHOCYTES # BLD AUTO: 2.95 10*3/MM3 (ref 0.7–3.1)
LYMPHOCYTES NFR BLD AUTO: 35.3 % (ref 19.6–45.3)
MCH RBC QN AUTO: 29.3 PG (ref 26.6–33)
MCHC RBC AUTO-ENTMCNC: 33 G/DL (ref 31.5–35.7)
MCV RBC AUTO: 88.8 FL (ref 79–97)
MONOCYTES # BLD AUTO: 0.71 10*3/MM3 (ref 0.1–0.9)
MONOCYTES NFR BLD AUTO: 8.5 % (ref 5–12)
NEUTROPHILS NFR BLD AUTO: 4.46 10*3/MM3 (ref 1.7–7)
NEUTROPHILS NFR BLD AUTO: 53.4 % (ref 42.7–76)
NRBC BLD AUTO-RTO: 0 /100 WBC (ref 0–0.2)
PLATELET # BLD AUTO: 158 10*3/MM3 (ref 140–450)
PMV BLD AUTO: 9.8 FL (ref 6–12)
POTASSIUM SERPL-SCNC: 4 MMOL/L (ref 3.5–5.2)
PROT SERPL-MCNC: 6.9 G/DL (ref 6–8.5)
RBC # BLD AUTO: 5.01 10*6/MM3 (ref 4.14–5.8)
SODIUM SERPL-SCNC: 140 MMOL/L (ref 136–145)
WBC NRBC COR # BLD AUTO: 8.36 10*3/MM3 (ref 3.4–10.8)

## 2024-10-01 PROCEDURE — 80053 COMPREHEN METABOLIC PANEL: CPT

## 2024-10-01 PROCEDURE — 36415 COLL VENOUS BLD VENIPUNCTURE: CPT

## 2024-10-01 PROCEDURE — 85379 FIBRIN DEGRADATION QUANT: CPT | Performed by: INTERNAL MEDICINE

## 2024-10-01 PROCEDURE — 85025 COMPLETE CBC W/AUTO DIFF WBC: CPT

## 2024-10-01 NOTE — PROGRESS NOTES
[facility]    CONSULTING IN BLOOD DISORDERS & CANCER      REASON FOR CONSULTATION/CHIEF COMPLAINT:     Evaluation and management for history of DVT PE and factor V Leiden mutation                             REQUESTING PHYSICIAN: No ref. provider found  RECORDS OBTAINED:  Records of the patients history including those from the electronic medical record were reviewed and summarized in detail.    HISTORY OF PRESENT ILLNESS:    The patient is a 59 y.o. year old male with medical history significant for history of DVTs/PE and factor V Leiden mutation comes for perioperative anticoagulation evaluation and management prior to right Achilles tendon rupture repair surgery.    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  Further workup noted right Achilles tendon rupture.  He has seen Dr. Lees, orthopedic surgery who plans to perform surgical repair on 7/3/2024.    Patient has been referred to hematology clinic to formulate perioperative anticoagulation plan in view of history of DVT/PE and factor V Leiden mutation.    Patient states he had his first episode of LLE DVT after a long flight from [place] in a year 2005.  He took anticoagulation with warfarin for around 6 months and then stopped.  Patient had another episode of LLE DVT and PE after undergoing left ACL tear surgery in 2006.  Patient states he underwent hypercoagulable workup at that time and was found to have factor V Leiden mutation.  He is unsure about heterozygous vs homozygous mutation, although he was told it was a lower risk mutation.  He was resumed back on warfarin which she took until 2021 when seen by Dr. Orr, hematologist with [facility] who switched him to Xarelto.  The dose was changed to prophylactic dose of Xarelto 10 mg daily.    Patient has stayed on Xarelto 10 mg daily since then.  Denies any bleeding or excessive bruises.  Family history is significant for maternal grandmother and sister with  history of blood clots.    July 2024: Patient underwent right Achilles tendon repair surgery on 7/3/2024.  Uneventful postoperative course.  Patient had increased the dose of Xarelto to 20 mg daily prior to surgery and stopped 3 days before.  He resumed Xarelto 20 mg daily a day after surgery.  He has stayed on the same dose for now.  Denies any bleeding or bruises.  Right lower extremity still in a cast.  Planning to get cast removed next week.  After that, he will get a boot and then a shoe.  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    10/1/2024: Continued on Xarelto 20 mg daily for another 2 months due to elevated D-dimer of 19.28.  The D-dimer level from today decreased to 0.62.  Plan to decrease Xarelto dose to 10 mg daily and continue indefinitely.    INTERIM HISTORY:  Patient returns to the clinic for a follow-up visit.  Xarelto 20 mg daily well.  Recently Returned from a trip to [place].  Continues to do physical therapy on his right leg/ankle.        Past Medical History:   Diagnosis Date    Acromioclavicular separation 2003    Arthritis of back 2010    Osgood-Schlatter's disease      Past Surgical History:   Procedure Laterality Date    HAND SURGERY  1980    Right wrist Cyst removal    KNEE SURGERY  2007    Left ACL reconstruction       MEDICATIONS    Current Outpatient Medications:     rivaroxaban (XARELTO) 20 MG tablet, Take 1 tablet by mouth Daily., Disp: 60 tablet, Rfl: 0    ALLERGIES:   No Known Allergies    SOCIAL HISTORY:       Social History     Socioeconomic History    Marital status:    Tobacco Use    Smoking status: Never    Smokeless tobacco: Never   Vaping Use    Vaping status: Never Used   Substance and Sexual Activity    Alcohol use: Not Currently     Alcohol/week: 4.0 standard drinks of alcohol     Types: 4 Drinks containing 0.5 oz of alcohol per week    Drug use: Never    Sexual activity: Yes     Partners: Female     Birth control/protection: Vasectomy        "  FAMILY HISTORY:  No family history on file.  As per HPI    REVIEW OF SYSTEMS:  As per HPI       Vitals:    10/01/24 1524   BP: 153/86   Pulse: 59   Resp: 16   Temp: 98.1 °F (36.7 °C)   TempSrc: Oral   SpO2: 97%   Weight: 96.5 kg (212 lb 11.2 oz)   Height: 175.3 cm (69.02\")   PainSc: 0-No pain         10/1/2024     3:28 PM   Current Status   ECOG score 0      PHYSICAL EXAM:    CONSTITUTIONAL:  Vital signs reviewed.  No distress, looks comfortable.  EYES:  Conjunctiva and lids unremarkable.   EARS,NOSE,MOUTH,THROAT:  Ears and nose appear unremarkable.  Lips, teeth, gums appear unremarkable.  RESPIRATORY:  Normal respiratory effort.    CARDIOVASCULAR:  Normal heart rate  GASTROINTESTINAL: Abdomen appears unremarkable.  Nondistended   LYMPHATIC:  No cervical, supraclavicular lymphadenopathy.  NEURO: AAO no focal deficits.  Appears to have equal strength all 4 extremities.  MUSCULOSKELETAL:  Unremarkable digits/nails.  No cyanosis or clubbing.  No apparent joint deformities.  Right foot in a cast  SKIN:  Warm.  No rashes.  PSYCHIATRIC:  Normal judgment and insight.  Normal mood and affect.     RECENT LABS:        Lab on 10/01/2024   Component Date Value Ref Range Status    WBC 10/01/2024 8.36  3.40 - 10.80 10*3/mm3 Final    RBC 10/01/2024 5.01  4.14 - 5.80 10*6/mm3 Final    Hemoglobin 10/01/2024 14.7  13.0 - 17.7 g/dL Final    Hematocrit 10/01/2024 44.5  37.5 - 51.0 % Final    MCV 10/01/2024 88.8  79.0 - 97.0 fL Final    MCH 10/01/2024 29.3  26.6 - 33.0 pg Final    MCHC 10/01/2024 33.0  31.5 - 35.7 g/dL Final    RDW 10/01/2024 12.6  12.3 - 15.4 % Final    RDW-SD 10/01/2024 41.1  37.0 - 54.0 fl Final    MPV 10/01/2024 9.8  6.0 - 12.0 fL Final    Platelets 10/01/2024 158  140 - 450 10*3/mm3 Final    Neutrophil % 10/01/2024 53.4  42.7 - 76.0 % Final    Lymphocyte % 10/01/2024 35.3  19.6 - 45.3 % Final    Monocyte % 10/01/2024 8.5  5.0 - 12.0 % Final    Eosinophil % 10/01/2024 2.0  0.3 - 6.2 % Final    Basophil % " 10/01/2024 0.6  0.0 - 1.5 % Final    Immature Grans % 10/01/2024 0.2  0.0 - 0.5 % Final    Neutrophils, Absolute 10/01/2024 4.46  1.70 - 7.00 10*3/mm3 Final    Lymphocytes, Absolute 10/01/2024 2.95  0.70 - 3.10 10*3/mm3 Final    Monocytes, Absolute 10/01/2024 0.71  0.10 - 0.90 10*3/mm3 Final    Eosinophils, Absolute 10/01/2024 0.17  0.00 - 0.40 10*3/mm3 Final    Basophils, Absolute 10/01/2024 0.05  0.00 - 0.20 10*3/mm3 Final    Immature Grans, Absolute 10/01/2024 0.02  0.00 - 0.05 10*3/mm3 Final    nRBC 10/01/2024 0.0  0.0 - 0.2 /100 WBC Final       ASSESSMENT:  Patient is a pleasant 59-year-old male with medical history significant for history of DVTs/PE and factor V Leiden mutation comes for perioperative anticoagulation evaluation and management prior to right Achilles tendon rupture repair surgery.    # History of DVT/PE in the setting of factor V Leiden (? heterozygous) mutation:  Patient states he had his first episode of LLE DVT after a long flight from [place] in year 2005.  He took anticoagulation with warfarin for around 6 months and then stopped.    Patient had another episode of LLE DVT and PE after undergoing left ACL tear surgery in 2006.  Patient states he underwent hypercoagulable workup at that time and was found to have factor V Leiden mutation.  He is unsure about heterozygous vs homozygous mutation, although he was told it was a lower risk mutation.   He was resumed back on warfarin which she took until 2021 when seen by Dr. Orr, hematologist with [facility] who switched him to Xarelto 10 mg daily.  Patient has stayed on Xarelto 10 mg daily since then.  Denies any bleeding or excessive bruises.  Family history is significant for maternal grandmother and sister with history of blood clots.  No current symptoms of DVT/PE.    7/3/2024: Patient underwent right Achilles tendon repair.  Uneventful postoperative course.  8/1/2024: Patient had increased the dose of Xarelto to 20 mg daily immediately  before and after the right Achilles tendon repair surgery.  Tolerating it well.  No bleeding/bruises.  D-dimer levels from today elevated at 19.28.  This is likely due to recent surgery, however due to high risk for VTE's-will continue Xarelto 20 mg daily for another 2 months (until end of September 2024).  Reevaluate after that.  Patient is agreeable  10/1/2024: Tolerating Xarelto 20 mg daily well.  Repeat D-dimer levels from today has gone down to 0.62.  Advised to decrease the dose of Xarelto to 10 mg daily and continue indefinitely.  I will send a refill for 3 months supply.  Patient advised to follow-up with his PCP for future refills.  I will see him back in this clinic on as needed basis.    # Perioperative anticoagulation management:  Given recurrent DVTs/PE and underlying factor V Leiden mutation, he is at high risk of recurrent VTE's.  Patient took increased dose of Xarelto 20 mg daily perioperatively.  No bridge anticoagulation.    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  Further workup noted right Achilles tendon rupture.    He was seen Dr. Lees, orthopedic surgery who performed surgical repair on 7/3/2024.  Anticoagulation management as above    PLAN:   - Repeat D-dimer levels from today has gone down to 0.62.    - Advised to decrease the dose of Xarelto to 10 mg daily and continue indefinitely.    - I will send a refill for 3 months supply.    - Patient advised to follow-up with his PCP for future refills.    - I will see him back in this clinic on as needed basis.    No orders of the defined types were placed in this encounter.  Total time spent during this patient encounter is 32 minutes. The total time spent with the patient includes: preparing to see the patient by reviewing of tests, prior notes or other relevant information, performing appropriate independent examination & evaluation, counseling, ordering of  medications, tests or procedures, documenting clinic information in the electronic medical records or other health records, independently interpreting results of tests and communicating the results to the patient/family or caregiver.

## 2024-12-13 ENCOUNTER — OFFICE VISIT (OUTPATIENT)
Dept: INTERNAL MEDICINE | Age: 59
End: 2024-12-13
Payer: COMMERCIAL

## 2024-12-13 VITALS
TEMPERATURE: 97.3 F | OXYGEN SATURATION: 96 % | DIASTOLIC BLOOD PRESSURE: 82 MMHG | HEART RATE: 88 BPM | HEIGHT: 69 IN | WEIGHT: 212 LBS | BODY MASS INDEX: 31.4 KG/M2 | SYSTOLIC BLOOD PRESSURE: 140 MMHG

## 2024-12-13 DIAGNOSIS — Z12.5 PROSTATE CANCER SCREENING: ICD-10-CM

## 2024-12-13 DIAGNOSIS — R03.0 ELEVATED BLOOD-PRESSURE READING WITHOUT DIAGNOSIS OF HYPERTENSION: ICD-10-CM

## 2024-12-13 DIAGNOSIS — B35.1 FUNGAL TOENAIL INFECTION: ICD-10-CM

## 2024-12-13 DIAGNOSIS — E78.00 PURE HYPERCHOLESTEROLEMIA: ICD-10-CM

## 2024-12-13 DIAGNOSIS — M54.50 LEFT LUMBAR PAIN: ICD-10-CM

## 2024-12-13 DIAGNOSIS — Z11.59 NEED FOR HEPATITIS C SCREENING TEST: ICD-10-CM

## 2024-12-13 DIAGNOSIS — R73.03 PREDIABETES: ICD-10-CM

## 2024-12-13 DIAGNOSIS — Z12.11 COLON CANCER SCREENING: ICD-10-CM

## 2024-12-13 DIAGNOSIS — Z76.89 ENCOUNTER TO ESTABLISH CARE: Primary | ICD-10-CM

## 2024-12-13 RX ORDER — CYCLOBENZAPRINE HCL 10 MG
10 TABLET ORAL 3 TIMES DAILY PRN
Qty: 30 TABLET | Refills: 0 | Status: SHIPPED | OUTPATIENT
Start: 2024-12-13

## 2024-12-13 NOTE — PROGRESS NOTES
I N T E R N A L  M E D I C I N E  HATTIE Chopra    ENCOUNTER DATE:  12/13/2024    Zhao Yeh / 59 y.o. / male      CHIEF COMPLAINT / REASON FOR OFFICE VISIT     Establish Care and Back Pain (PT has a chronic back pain. He has some compressed discs. Just a few dasy ago he was doing upper body work. He woke the next morning and coughed and his back fired in pain.)      ASSESSMENT & PLAN     Diagnoses and all orders for this visit:    1. Encounter to establish care (Primary)    2. Left lumbar pain  -     cyclobenzaprine (FLEXERIL) 10 MG tablet; Take 1 tablet by mouth 3 (Three) Times a Day As Needed for Muscle Spasms.  Dispense: 30 tablet; Refill: 0    3. Elevated blood-pressure reading without diagnosis of hypertension  -     CBC & Differential  -     Comprehensive Metabolic Panel  -     TSH+Free T4  -     Urinalysis without microscopic (no culture) - Urine, Clean Catch    4. Pure hypercholesterolemia  -     CT Cardiac Calcium Score Without Dye; Future  -     Comprehensive Metabolic Panel  -     Lipid Panel With / Chol / HDL Ratio    5. Prediabetes  -     Hemoglobin A1c    6. Prostate cancer screening  -     PSA Screen    7. Need for hepatitis C screening test  -     Hepatitis C Antibody    8. Colon cancer screening  -     Cologuard - Stool, Per Rectum; Future         SUMMARY/DISCUSSION  His left lumbar pain is improving slowly and he is without neurological deficits; suspect muscle strain.  May use muscle relaxer PRN TID.  Aware this medication may make him drowsy and will avoid driving.  Declined XR imaging/ PT referral.  Update labs in anticipation of starting oral antifungal therapy.  He is aware of importance of checking labs after starting RX in 1 month - will do so at planned annual physical.  Will start monitoring daily BP readings at home to ensure it is averaging < 130/80.  Encouraged low sodium diet.  For his personal history of HLD, recommend CT Cardiac Calcium testing.  He is resistant to  "starting statin medication at this time.  Encouraged low carb diet.     40 minutes spent reviewing chart, meeting with patient, developing plan of care, documentation.     Next Appointment with me: Visit date not found    Return in about 5 weeks (around 1/17/2025) for Annual physical, lab only appt for fasting labs.      VITAL SIGNS     Visit Vitals  /82   Pulse 88   Temp 97.3 °F (36.3 °C)   Ht 175.3 cm (69\")   Wt 96.2 kg (212 lb)   SpO2 96%   BMI 31.31 kg/m²             Wt Readings from Last 3 Encounters:   12/13/24 96.2 kg (212 lb)   10/01/24 96.5 kg (212 lb 11.2 oz)   08/01/24 92.5 kg (204 lb)     Body mass index is 31.31 kg/m².        MEDICATIONS AT THE TIME OF OFFICE VISIT     Current Outpatient Medications on File Prior to Visit   Medication Sig Dispense Refill    rivaroxaban (Xarelto) 10 MG tablet Take 1 tablet by mouth Daily. 90 tablet 2     No current facility-administered medications on file prior to visit.        HISTORY OF PRESENT ILLNESS     Here to establish care.  Former patient of [place] Provider HATTIE Ruiz.  Last seen August 2022.      Followed by hematology/ oncology, Dr. Patterson, for history of DVT, PE and factor V Leiden mutation.  Last seen October 1, 2024.  Recommended to continue Xarelto 10 mg daily indefinitely and follow up with PCP for future refills.  May be seen in clinic PRN.      HLD: December 2022 lipid panel with elevated ; normal triglycerides 84.  Not as active recently due to right achilles problems, for which he is currently working with PT.  Eats home cooked meals, tries to follow well balanced diet.  Dad with HLD    Prediabetes: May 2022 A1C 5.7.    Cologuard May 2022 negative; due again June 1, 2025 and agreeable for order.    May 2022 PSA 0.17    Few day history of acute on chronic left lumbar pain.  Symptoms the morning after weight lifting the evening before.  Using ice PRN.  No extremity pain, numbness, tingling, weakness, bowel/ bladder changes.  Works " as , desk job.  Took leftover oxycodone from surgery yesterday morning with benefit.  November 2020 Lumbar MRI showed moderate to severe foraminal stenosis of lumbar spine, along with narrowing of neural foramina.      Multiple right toenails with fungal infection, unresponsive to topical treatment.      BP is mildly elevated at today's visit; not currently monitoring at home.        Patient Care Team:  Regi Tom APRN as PCP - General (Family Medicine)  Max Lees Jr., MD as Referring Physician (Orthopedic Surgery)  Juanjo Patterson MD as Consulting Physician (Hematology and Oncology)    REVIEW OF SYSTEMS     Review of Systems   Constitutional:  Negative for chills, fever and unexpected weight change.   Respiratory:  Negative for cough, chest tightness and shortness of breath.    Cardiovascular:  Negative for chest pain, palpitations and leg swelling.   Musculoskeletal:  Positive for back pain.   Skin:         +Toenail discoloration   Neurological:  Negative for dizziness, weakness, light-headedness and headaches.   Psychiatric/Behavioral:  The patient is not nervous/anxious.           PHYSICAL EXAMINATION     Physical Exam  Vitals reviewed.   Constitutional:       General: He is not in acute distress.     Appearance: Normal appearance. He is not ill-appearing, toxic-appearing or diaphoretic.   HENT:      Head: Normocephalic and atraumatic.   Cardiovascular:      Rate and Rhythm: Normal rate and regular rhythm.      Heart sounds: Normal heart sounds.   Pulmonary:      Effort: Pulmonary effort is normal.      Breath sounds: Normal breath sounds.   Musculoskeletal:      Lumbar back: Tenderness (Left SI) present. No bony tenderness.   Feet:      Right foot:      Toenail Condition: Fungal disease present.  Neurological:      Mental Status: He is alert and oriented to person, place, and time. Mental status is at baseline.      Sensory: No sensory deficit.      Motor: No weakness.    Psychiatric:         Mood and Affect: Mood normal.         Behavior: Behavior normal.         Thought Content: Thought content normal.         Judgment: Judgment normal.           REVIEWED DATA     Labs:           Imaging:            Medical Tests:           Summary of old records / correspondence / consultant report:           Request outside records:

## 2024-12-16 ENCOUNTER — PATIENT ROUNDING (BHMG ONLY) (OUTPATIENT)
Dept: INTERNAL MEDICINE | Age: 59
End: 2024-12-16
Payer: COMMERCIAL

## 2024-12-16 NOTE — PROGRESS NOTES
A [employer] message has been sent to the patient for PATIENT ROUNDING with [facility] [place].

## 2024-12-18 ENCOUNTER — TELEPHONE (OUTPATIENT)
Dept: INTERNAL MEDICINE | Age: 59
End: 2024-12-18
Payer: COMMERCIAL

## 2024-12-18 LAB
ALBUMIN SERPL-MCNC: 4.2 G/DL (ref 3.5–5.2)
ALBUMIN/GLOB SERPL: 1.5 G/DL
ALP SERPL-CCNC: 69 U/L (ref 39–117)
ALT SERPL-CCNC: 30 U/L (ref 1–41)
APPEARANCE UR: CLEAR
AST SERPL-CCNC: 22 U/L (ref 1–40)
BASOPHILS # BLD AUTO: 0.06 10*3/MM3 (ref 0–0.2)
BASOPHILS NFR BLD AUTO: 0.8 % (ref 0–1.5)
BILIRUB SERPL-MCNC: 0.7 MG/DL (ref 0–1.2)
BILIRUB UR QL STRIP: NEGATIVE
BUN SERPL-MCNC: 20 MG/DL (ref 6–20)
BUN/CREAT SERPL: 18 (ref 7–25)
CALCIUM SERPL-MCNC: 9.3 MG/DL (ref 8.6–10.5)
CHLORIDE SERPL-SCNC: 103 MMOL/L (ref 98–107)
CHOLEST SERPL-MCNC: 277 MG/DL (ref 0–200)
CHOLEST/HDLC SERPL: 6.16 {RATIO}
CO2 SERPL-SCNC: 29 MMOL/L (ref 22–29)
COLOR UR: YELLOW
CREAT SERPL-MCNC: 1.11 MG/DL (ref 0.76–1.27)
EGFRCR SERPLBLD CKD-EPI 2021: 77 ML/MIN/1.73
EOSINOPHIL # BLD AUTO: 0.18 10*3/MM3 (ref 0–0.4)
EOSINOPHIL NFR BLD AUTO: 2.3 % (ref 0.3–6.2)
ERYTHROCYTE [DISTWIDTH] IN BLOOD BY AUTOMATED COUNT: 12.8 % (ref 12.3–15.4)
GLOBULIN SER CALC-MCNC: 2.8 GM/DL
GLUCOSE SERPL-MCNC: 99 MG/DL (ref 65–99)
GLUCOSE UR QL STRIP: NEGATIVE
HBA1C MFR BLD: 5.7 % (ref 4.8–5.6)
HCT VFR BLD AUTO: 45.8 % (ref 37.5–51)
HCV IGG SERPL QL IA: NON REACTIVE
HDLC SERPL-MCNC: 45 MG/DL (ref 40–60)
HGB BLD-MCNC: 15.3 G/DL (ref 13–17.7)
HGB UR QL STRIP: NEGATIVE
IMM GRANULOCYTES # BLD AUTO: 0.05 10*3/MM3 (ref 0–0.05)
IMM GRANULOCYTES NFR BLD AUTO: 0.6 % (ref 0–0.5)
KETONES UR QL STRIP: NEGATIVE
LDLC SERPL CALC-MCNC: 198 MG/DL (ref 0–100)
LEUKOCYTE ESTERASE UR QL STRIP: NEGATIVE
LYMPHOCYTES # BLD AUTO: 3.23 10*3/MM3 (ref 0.7–3.1)
LYMPHOCYTES NFR BLD AUTO: 40.9 % (ref 19.6–45.3)
MCH RBC QN AUTO: 29.5 PG (ref 26.6–33)
MCHC RBC AUTO-ENTMCNC: 33.4 G/DL (ref 31.5–35.7)
MCV RBC AUTO: 88.2 FL (ref 79–97)
MONOCYTES # BLD AUTO: 0.78 10*3/MM3 (ref 0.1–0.9)
MONOCYTES NFR BLD AUTO: 9.9 % (ref 5–12)
NEUTROPHILS # BLD AUTO: 3.6 10*3/MM3 (ref 1.7–7)
NEUTROPHILS NFR BLD AUTO: 45.5 % (ref 42.7–76)
NITRITE UR QL STRIP: NEGATIVE
NRBC BLD AUTO-RTO: 0 /100 WBC (ref 0–0.2)
PH UR STRIP: 6.5 [PH] (ref 5–8)
PLATELET # BLD AUTO: 172 10*3/MM3 (ref 140–450)
POTASSIUM SERPL-SCNC: 4.2 MMOL/L (ref 3.5–5.2)
PROT SERPL-MCNC: 7 G/DL (ref 6–8.5)
PROT UR QL STRIP: ABNORMAL
PSA SERPL-MCNC: 0.16 NG/ML (ref 0–4)
RBC # BLD AUTO: 5.19 10*6/MM3 (ref 4.14–5.8)
SODIUM SERPL-SCNC: 141 MMOL/L (ref 136–145)
SP GR UR STRIP: 1.02 (ref 1–1.03)
T4 FREE SERPL-MCNC: 1.06 NG/DL (ref 0.92–1.68)
TRIGL SERPL-MCNC: 181 MG/DL (ref 0–150)
TSH SERPL DL<=0.005 MIU/L-ACNC: 3.02 UIU/ML (ref 0.27–4.2)
UROBILINOGEN UR STRIP-MCNC: ABNORMAL MG/DL
VLDLC SERPL CALC-MCNC: 34 MG/DL (ref 5–40)
WBC # BLD AUTO: 7.9 10*3/MM3 (ref 3.4–10.8)

## 2024-12-18 NOTE — TELEPHONE ENCOUNTER
Caller: Zhao Yeh    Relationship: Self    Best call back number: [phone]     Who are you requesting to speak with (clinical staff, provider,  specific staff member): CLINICAL STAFF      What was the call regarding: PLEASE CALL BACK CONCERNING THE CARDIAC CT SCAN AS DISCUSSED IN LAST OFFICE VISIT

## 2024-12-19 ENCOUNTER — TELEPHONE (OUTPATIENT)
Dept: INTERNAL MEDICINE | Age: 59
End: 2024-12-19

## 2024-12-19 DIAGNOSIS — B35.1 FUNGAL TOENAIL INFECTION: Primary | ICD-10-CM

## 2024-12-19 RX ORDER — TERBINAFINE HYDROCHLORIDE 250 MG/1
250 TABLET ORAL DAILY
Qty: 30 TABLET | Refills: 0 | Status: SHIPPED | OUTPATIENT
Start: 2024-12-19

## 2024-12-19 NOTE — TELEPHONE ENCOUNTER
Caller: Zhao Yeh    Relationship: Self    Best call back number:     [phone]        Which medication are you concerned about: ORAL MEDICATION FOR TOE NAIL    Who prescribed you this medication: MITZY CUEVA      What are your concerns:     PATIENT IS WANTING TO START TAKING ORAL MEDICATION FOR HIS TOE NAIL.  HE STATED THIS WAS DISCUSSED AT HIS APPOINTMENT LAST WEEK     PATIENT ALSO WANTED YOU TO KNOW THAT HE WILL BE DOING THE CARDIAC PT AND CARDIAC CT SCAN      [id number] - [address] - [phone]  - [phone] FX     PLEASE ADVISE

## 2025-01-08 DIAGNOSIS — B35.1 FUNGAL TOENAIL INFECTION: Primary | ICD-10-CM

## 2025-01-08 DIAGNOSIS — B35.1 FUNGAL TOENAIL INFECTION: ICD-10-CM

## 2025-01-08 RX ORDER — TERBINAFINE HYDROCHLORIDE 250 MG/1
250 TABLET ORAL DAILY
Qty: 30 TABLET | Refills: 0 | OUTPATIENT
Start: 2025-01-08

## 2025-01-17 DIAGNOSIS — B35.1 FUNGAL TOENAIL INFECTION: ICD-10-CM

## 2025-01-18 DIAGNOSIS — B35.1 FUNGAL TOENAIL INFECTION: ICD-10-CM

## 2025-01-20 RX ORDER — TERBINAFINE HYDROCHLORIDE 250 MG/1
250 TABLET ORAL DAILY
Qty: 30 TABLET | Refills: 0 | OUTPATIENT
Start: 2025-01-20

## 2025-01-21 RX ORDER — TERBINAFINE HYDROCHLORIDE 250 MG/1
250 TABLET ORAL DAILY
Qty: 60 TABLET | Refills: 0 | Status: SHIPPED | OUTPATIENT
Start: 2025-01-21

## 2025-01-31 ENCOUNTER — HOSPITAL ENCOUNTER (OUTPATIENT)
Dept: CT IMAGING | Facility: HOSPITAL | Age: 60
Discharge: HOME | End: 2025-01-31

## 2025-01-31 DIAGNOSIS — E78.00 PURE HYPERCHOLESTEROLEMIA: ICD-10-CM

## 2025-01-31 PROCEDURE — 75571 CT HRT W/O DYE W/CA TEST: CPT

## 2025-02-06 DIAGNOSIS — E78.00 PURE HYPERCHOLESTEROLEMIA: Primary | ICD-10-CM

## 2025-02-06 DIAGNOSIS — I25.83 CORONARY ARTERY DISEASE DUE TO LIPID RICH PLAQUE: Primary | ICD-10-CM

## 2025-02-06 DIAGNOSIS — I25.10 CORONARY ARTERY DISEASE DUE TO LIPID RICH PLAQUE: Primary | ICD-10-CM

## 2025-02-06 RX ORDER — ROSUVASTATIN CALCIUM 20 MG/1
20 TABLET, COATED ORAL NIGHTLY
Qty: 90 TABLET | Refills: 0 | Status: SHIPPED | OUTPATIENT
Start: 2025-02-06

## 2025-02-11 ENCOUNTER — APPOINTMENT (OUTPATIENT)
Dept: CT IMAGING | Facility: HOSPITAL | Age: 60
End: 2025-02-11
Payer: COMMERCIAL

## 2025-02-11 ENCOUNTER — HOSPITAL ENCOUNTER (EMERGENCY)
Facility: HOSPITAL | Age: 60
Discharge: HOME | End: 2025-02-11
Attending: EMERGENCY MEDICINE | Admitting: EMERGENCY MEDICINE
Payer: COMMERCIAL

## 2025-02-11 VITALS
RESPIRATION RATE: 24 BRPM | DIASTOLIC BLOOD PRESSURE: 79 MMHG | HEART RATE: 60 BPM | TEMPERATURE: 97.6 F | SYSTOLIC BLOOD PRESSURE: 137 MMHG | OXYGEN SATURATION: 100 %

## 2025-02-11 DIAGNOSIS — N23 RENAL COLIC ON RIGHT SIDE: ICD-10-CM

## 2025-02-11 DIAGNOSIS — N20.1 CALCULUS OF URETER: Primary | ICD-10-CM

## 2025-02-11 DIAGNOSIS — Z79.01 CHRONIC ANTICOAGULATION: ICD-10-CM

## 2025-02-11 LAB
ALBUMIN SERPL-MCNC: 3.8 G/DL (ref 3.5–5.2)
ALBUMIN/GLOB SERPL: 1.4 G/DL
ALP SERPL-CCNC: 62 U/L (ref 39–117)
ALT SERPL W P-5'-P-CCNC: 36 U/L (ref 1–41)
ANION GAP SERPL CALCULATED.3IONS-SCNC: 10 MMOL/L (ref 5–15)
AST SERPL-CCNC: 26 U/L (ref 1–40)
BACTERIA UR QL AUTO: ABNORMAL /HPF
BASOPHILS # BLD AUTO: 0.06 10*3/MM3 (ref 0–0.2)
BASOPHILS NFR BLD AUTO: 0.6 % (ref 0–1.5)
BILIRUB SERPL-MCNC: 0.4 MG/DL (ref 0–1.2)
BILIRUB UR QL STRIP: NEGATIVE
BUN SERPL-MCNC: 15 MG/DL (ref 6–20)
BUN/CREAT SERPL: 14 (ref 7–25)
CALCIUM SPEC-SCNC: 8.9 MG/DL (ref 8.6–10.5)
CHLORIDE SERPL-SCNC: 105 MMOL/L (ref 98–107)
CLARITY UR: ABNORMAL
CO2 SERPL-SCNC: 22 MMOL/L (ref 22–29)
COLOR UR: ABNORMAL
CREAT SERPL-MCNC: 1.07 MG/DL (ref 0.76–1.27)
DEPRECATED RDW RBC AUTO: 41.9 FL (ref 37–54)
EGFRCR SERPLBLD CKD-EPI 2021: 80.4 ML/MIN/1.73
EOSINOPHIL # BLD AUTO: 0.11 10*3/MM3 (ref 0–0.4)
EOSINOPHIL NFR BLD AUTO: 1.1 % (ref 0.3–6.2)
ERYTHROCYTE [DISTWIDTH] IN BLOOD BY AUTOMATED COUNT: 13 % (ref 12.3–15.4)
GLOBULIN UR ELPH-MCNC: 2.7 GM/DL
GLUCOSE SERPL-MCNC: 129 MG/DL (ref 65–99)
GLUCOSE UR STRIP-MCNC: NEGATIVE MG/DL
HCT VFR BLD AUTO: 43.3 % (ref 37.5–51)
HGB BLD-MCNC: 14.5 G/DL (ref 13–17.7)
HGB UR QL STRIP.AUTO: ABNORMAL
HYALINE CASTS UR QL AUTO: ABNORMAL /LPF
IMM GRANULOCYTES # BLD AUTO: 0.02 10*3/MM3 (ref 0–0.05)
IMM GRANULOCYTES NFR BLD AUTO: 0.2 % (ref 0–0.5)
KETONES UR QL STRIP: NEGATIVE
LEUKOCYTE ESTERASE UR QL STRIP.AUTO: ABNORMAL
LIPASE SERPL-CCNC: 58 U/L (ref 13–60)
LYMPHOCYTES # BLD AUTO: 4.51 10*3/MM3 (ref 0.7–3.1)
LYMPHOCYTES NFR BLD AUTO: 45.8 % (ref 19.6–45.3)
MCH RBC QN AUTO: 29.8 PG (ref 26.6–33)
MCHC RBC AUTO-ENTMCNC: 33.5 G/DL (ref 31.5–35.7)
MCV RBC AUTO: 89.1 FL (ref 79–97)
MONOCYTES # BLD AUTO: 0.89 10*3/MM3 (ref 0.1–0.9)
MONOCYTES NFR BLD AUTO: 9 % (ref 5–12)
NEUTROPHILS NFR BLD AUTO: 4.26 10*3/MM3 (ref 1.7–7)
NEUTROPHILS NFR BLD AUTO: 43.3 % (ref 42.7–76)
NITRITE UR QL STRIP: NEGATIVE
NRBC BLD AUTO-RTO: 0 /100 WBC (ref 0–0.2)
PH UR STRIP.AUTO: 6 [PH] (ref 5–8)
PLATELET # BLD AUTO: 182 10*3/MM3 (ref 140–450)
PMV BLD AUTO: 9.9 FL (ref 6–12)
POTASSIUM SERPL-SCNC: 3.5 MMOL/L (ref 3.5–5.2)
PROT SERPL-MCNC: 6.5 G/DL (ref 6–8.5)
PROT UR QL STRIP: ABNORMAL
RBC # BLD AUTO: 4.86 10*6/MM3 (ref 4.14–5.8)
RBC # UR STRIP: ABNORMAL /HPF
REF LAB TEST METHOD: ABNORMAL
SODIUM SERPL-SCNC: 137 MMOL/L (ref 136–145)
SP GR UR STRIP: 1.01 (ref 1–1.03)
SQUAMOUS #/AREA URNS HPF: ABNORMAL /HPF
UROBILINOGEN UR QL STRIP: ABNORMAL
WBC # UR STRIP: ABNORMAL /HPF
WBC NRBC COR # BLD AUTO: 9.85 10*3/MM3 (ref 3.4–10.8)

## 2025-02-11 PROCEDURE — 96376 TX/PRO/DX INJ SAME DRUG ADON: CPT

## 2025-02-11 PROCEDURE — 85025 COMPLETE CBC W/AUTO DIFF WBC: CPT | Performed by: EMERGENCY MEDICINE

## 2025-02-11 PROCEDURE — 25010000002 ONDANSETRON PER 1 MG: Performed by: EMERGENCY MEDICINE

## 2025-02-11 PROCEDURE — 96375 TX/PRO/DX INJ NEW DRUG ADDON: CPT

## 2025-02-11 PROCEDURE — 25810000003 SODIUM CHLORIDE 0.9 % SOLUTION: Performed by: EMERGENCY MEDICINE

## 2025-02-11 PROCEDURE — 99284 EMERGENCY DEPT VISIT MOD MDM: CPT

## 2025-02-11 PROCEDURE — 36415 COLL VENOUS BLD VENIPUNCTURE: CPT

## 2025-02-11 PROCEDURE — 96374 THER/PROPH/DIAG INJ IV PUSH: CPT

## 2025-02-11 PROCEDURE — 74176 CT ABD & PELVIS W/O CONTRAST: CPT

## 2025-02-11 PROCEDURE — 81001 URINALYSIS AUTO W/SCOPE: CPT | Performed by: EMERGENCY MEDICINE

## 2025-02-11 PROCEDURE — 25010000002 MORPHINE PER 10 MG: Performed by: EMERGENCY MEDICINE

## 2025-02-11 PROCEDURE — 83690 ASSAY OF LIPASE: CPT | Performed by: EMERGENCY MEDICINE

## 2025-02-11 PROCEDURE — 80053 COMPREHEN METABOLIC PANEL: CPT | Performed by: EMERGENCY MEDICINE

## 2025-02-11 RX ORDER — HYDROCODONE BITARTRATE AND ACETAMINOPHEN 10; 325 MG/1; MG/1
1 TABLET ORAL ONCE
Status: COMPLETED | OUTPATIENT
Start: 2025-02-11 | End: 2025-02-11

## 2025-02-11 RX ORDER — TAMSULOSIN HYDROCHLORIDE 0.4 MG/1
1 CAPSULE ORAL DAILY
Qty: 30 CAPSULE | Refills: 0 | Status: SHIPPED | OUTPATIENT
Start: 2025-02-11

## 2025-02-11 RX ORDER — ONDANSETRON 2 MG/ML
4 INJECTION INTRAMUSCULAR; INTRAVENOUS ONCE
Status: COMPLETED | OUTPATIENT
Start: 2025-02-11 | End: 2025-02-11

## 2025-02-11 RX ORDER — ONDANSETRON 4 MG/1
4 TABLET, ORALLY DISINTEGRATING ORAL EVERY 8 HOURS PRN
Qty: 15 TABLET | Refills: 0 | Status: SHIPPED | OUTPATIENT
Start: 2025-02-11 | End: 2025-02-16

## 2025-02-11 RX ORDER — MORPHINE SULFATE 2 MG/ML
4 INJECTION, SOLUTION INTRAMUSCULAR; INTRAVENOUS ONCE
Status: COMPLETED | OUTPATIENT
Start: 2025-02-11 | End: 2025-02-11

## 2025-02-11 RX ORDER — HYDROCODONE BITARTRATE AND ACETAMINOPHEN 10; 325 MG/1; MG/1
1 TABLET ORAL EVERY 4 HOURS PRN
Qty: 15 TABLET | Refills: 0 | Status: SHIPPED | OUTPATIENT
Start: 2025-02-11

## 2025-02-11 RX ADMIN — MORPHINE SULFATE 4 MG: 2 INJECTION, SOLUTION INTRAMUSCULAR; INTRAVENOUS at 11:22

## 2025-02-11 RX ADMIN — ONDANSETRON 4 MG: 2 INJECTION, SOLUTION INTRAMUSCULAR; INTRAVENOUS at 09:53

## 2025-02-11 RX ADMIN — HYDROCODONE BITARTRATE AND ACETAMINOPHEN 1 TABLET: 10; 325 TABLET ORAL at 13:00

## 2025-02-11 RX ADMIN — MORPHINE SULFATE 4 MG: 2 INJECTION, SOLUTION INTRAMUSCULAR; INTRAVENOUS at 09:54

## 2025-02-11 RX ADMIN — SODIUM CHLORIDE 1000 ML: 9 INJECTION, SOLUTION INTRAVENOUS at 09:53

## 2025-02-11 NOTE — ED PROVIDER NOTES
EMERGENCY DEPARTMENT ENCOUNTER    Room Number:  16/16  PCP: Regi Tom APRN  Historian: Patient      HPI:  Chief Complaint: Acute right flank pain  A complete HPI/ROS/PMH/PSH/SH/FH are unobtainable due to: None    Context: Zhao Yeh is a 59 y.o. male who presents to the ED via same at his EMS from work c/o acute right lower quadrant abdominal pain that started yesterday briefly described as sharp in nature, improved overnight and then recurred severely at work today.  Had 1 episode of nausea and vomiting today.  Reported some blood in his urine yesterday and today.  Does take Xarelto for history of factor V Leiden.  Pain is not migratory, is mostly constant currently.  No measured fevers.  Denies any history of kidney stones.      MEDICAL RECORD REVIEW    External (non-ED) record review: Chart review in epic does confirm that the patient takes Xarelto    DOUGIE reviewed by Kang Jin MD         PAST MEDICAL HISTORY  Active Ambulatory Problems     Diagnosis Date Noted    Partial tear of right Achilles tendon 06/24/2024    Rupture of right gastrocnemius tendon 06/24/2024     Resolved Ambulatory Problems     Diagnosis Date Noted    No Resolved Ambulatory Problems     Past Medical History:   Diagnosis Date    Acromioclavicular separation 2003    Arthritis of back 2010    Osgood-Schlatter's disease     Pulmonary embolism May 15 2006         PAST SURGICAL HISTORY  Past Surgical History:   Procedure Laterality Date    HAND SURGERY  1980    Right wrist Cyst removal    KNEE SURGERY  2007    Left ACL reconstruction         FAMILY HISTORY  Family History   Problem Relation Age of Onset    Hyperlipidemia Father         Living    Hypertension Father     Coronary artery disease Maternal Grandfather     Alzheimer's disease Maternal Grandfather     Coronary artery disease Paternal Grandfather          SOCIAL HISTORY  Social History     Socioeconomic History    Marital status:    Tobacco Use    Smoking status:  Never     Passive exposure: Never    Smokeless tobacco: Never   Vaping Use    Vaping status: Never Used   Substance and Sexual Activity    Alcohol use: Yes     Alcohol/week: 4.0 standard drinks of alcohol     Types: 4 Drinks containing 0.5 oz of alcohol per week     Comment: 3-4 drinks per week    Drug use: Never    Sexual activity: Yes     Partners: Female     Birth control/protection: None, Vasectomy         ALLERGIES  Patient has no known allergies.        REVIEW OF SYSTEMS  Review of Systems     All systems reviewed and negative except for those discussed in HPI.       PHYSICAL EXAM    I have reviewed the triage vital signs and nursing notes.    ED Triage Vitals   Temp Heart Rate Resp BP SpO2   02/11/25 0920 02/11/25 0918 02/11/25 0918 02/11/25 0918 02/11/25 0918   97.6 °F (36.4 °C) 50 24 161/45 98 %      Temp src Heart Rate Source Patient Position BP Location FiO2 (%)   02/11/25 0920 -- -- -- --   Oral           Physical Exam  General: Appears uncomfortable, nontoxic, nondiaphoretic  HEENT: Mucous membranes moist, atraumatic, EOMI  Neck: Full ROM  Pulm: Symmetric chest rise, nonlabored, lungs CTAB  Cardiovascular: Regular rate and rhythm, intact distal pulses  GI: Soft, right lower quadrant tenderness to palpation, nondistended, no rebound, no guarding, bowel sounds present  MSK: Full ROM, no deformity  Skin: Warm, dry  Neuro: Awake, alert, oriented x 4, GCS 15, moving all extremities, no focal deficits  Psych: Calm, cooperative        LAB RESULTS  Recent Results (from the past 24 hours)   Comprehensive Metabolic Panel    Collection Time: 02/11/25  9:36 AM    Specimen: Blood   Result Value Ref Range    Glucose 129 (H) 65 - 99 mg/dL    BUN 15 6 - 20 mg/dL    Creatinine 1.07 0.76 - 1.27 mg/dL    Sodium 137 136 - 145 mmol/L    Potassium 3.5 3.5 - 5.2 mmol/L    Chloride 105 98 - 107 mmol/L    CO2 22.0 22.0 - 29.0 mmol/L    Calcium 8.9 8.6 - 10.5 mg/dL    Total Protein 6.5 6.0 - 8.5 g/dL    Albumin 3.8 3.5 - 5.2  g/dL    ALT (SGPT) 36 1 - 41 U/L    AST (SGOT) 26 1 - 40 U/L    Alkaline Phosphatase 62 39 - 117 U/L    Total Bilirubin 0.4 0.0 - 1.2 mg/dL    Globulin 2.7 gm/dL    A/G Ratio 1.4 g/dL    BUN/Creatinine Ratio 14.0 7.0 - 25.0    Anion Gap 10.0 5.0 - 15.0 mmol/L    eGFR 80.4 >60.0 mL/min/1.73   Lipase    Collection Time: 02/11/25  9:36 AM    Specimen: Blood   Result Value Ref Range    Lipase 58 13 - 60 U/L   CBC Auto Differential    Collection Time: 02/11/25  9:36 AM    Specimen: Blood   Result Value Ref Range    WBC 9.85 3.40 - 10.80 10*3/mm3    RBC 4.86 4.14 - 5.80 10*6/mm3    Hemoglobin 14.5 13.0 - 17.7 g/dL    Hematocrit 43.3 37.5 - 51.0 %    MCV 89.1 79.0 - 97.0 fL    MCH 29.8 26.6 - 33.0 pg    MCHC 33.5 31.5 - 35.7 g/dL    RDW 13.0 12.3 - 15.4 %    RDW-SD 41.9 37.0 - 54.0 fl    MPV 9.9 6.0 - 12.0 fL    Platelets 182 140 - 450 10*3/mm3    Neutrophil % 43.3 42.7 - 76.0 %    Lymphocyte % 45.8 (H) 19.6 - 45.3 %    Monocyte % 9.0 5.0 - 12.0 %    Eosinophil % 1.1 0.3 - 6.2 %    Basophil % 0.6 0.0 - 1.5 %    Immature Grans % 0.2 0.0 - 0.5 %    Neutrophils, Absolute 4.26 1.70 - 7.00 10*3/mm3    Lymphocytes, Absolute 4.51 (H) 0.70 - 3.10 10*3/mm3    Monocytes, Absolute 0.89 0.10 - 0.90 10*3/mm3    Eosinophils, Absolute 0.11 0.00 - 0.40 10*3/mm3    Basophils, Absolute 0.06 0.00 - 0.20 10*3/mm3    Immature Grans, Absolute 0.02 0.00 - 0.05 10*3/mm3    nRBC 0.0 0.0 - 0.2 /100 WBC   Urinalysis With Microscopic If Indicated (No Culture) - Urine, Clean Catch    Collection Time: 02/11/25  9:55 AM    Specimen: Urine, Clean Catch   Result Value Ref Range    Color, UA Red (A) Yellow, Straw    Appearance, UA Cloudy (A) Clear    pH, UA 6.0 5.0 - 8.0    Specific Gravity, UA 1.015 1.005 - 1.030    Glucose, UA Negative Negative    Ketones, UA Negative Negative    Bilirubin, UA Negative Negative    Blood, UA Large (3+) (A) Negative    Protein, UA 30 mg/dL (1+) (A) Negative    Leuk Esterase, UA Small (1+) (A) Negative    Nitrite, UA  Negative Negative    Urobilinogen, UA 0.2 E.U./dL 0.2 - 1.0 E.U./dL   Urinalysis, Microscopic Only - Urine, Clean Catch    Collection Time: 02/11/25  9:55 AM    Specimen: Urine, Clean Catch   Result Value Ref Range    RBC, UA Too Numerous to Count (A) None Seen, 0-2 /HPF    WBC, UA 6-10 (A) None Seen, 0-2 /HPF    Bacteria, UA None Seen None Seen /HPF    Squamous Epithelial Cells, UA 0-2 None Seen, 0-2 /HPF    Hyaline Casts, UA 0-2 None Seen /LPF    Methodology Automated Microscopy        Ordered the above labs and independently interpreted results. My findings will be discussed in the medical decision making section below        RADIOLOGY  CT Abdomen Pelvis Stone Protocol    Result Date: 2/11/2025  CT ABDOMEN AND PELVIS STONE PROTOCOL  HISTORY: 59 years of age, male. Right lower quadrant pain with hematuria.  TECHNIQUE:  CT includes axial imaging from the lung bases to the trochanters without intravenous contrast and with use of oral contrast. Data reconstructed in coronal and sagittal planes. Radiation dose reduction techniques were utilized, including automated exposure control and exposure modulation based on body size.  COMPARISON: None.  FINDINGS: Calcified nodule is present in the right lower lobe. There is subsegmental right lower lobe atelectasis.  Liver, decompressed gallbladder, spleen, adrenal glands, and pancreas exhibit normal noncontrasted CT appearance.  3 mm right proximal ureteral stone at the horizontal level of the right renal lower pole. Mild right hydronephrosis. 2 mm nonobstructing right interpolar and lower pole renal stones are present. There are 4 left nonobstructing renal stones with the largest measuring 4 mm. No left ureteral stone or left hydronephrosis. Urinary bladder mostly decompressed.  No bowel dilatation or evidence for bowel obstruction. No ascites. Fat density extends into both inguinal canals. Degenerative disc disease in the lumbar spine best demonstrated at L5-S1.      1. 3  mm right proximal ureteral stone with mild right hydronephrosis. 2. Multiple bilateral nonobstructing renal stones with the largest in the left kidney measuring 4 mm.  Radiation dose reduction techniques were utilized, including automated exposure control and exposure modulation based on body size.   This report was finalized on 2/11/2025 11:03 AM by Zi Paulson M.D on Workstation: [id number]       Ordered the above noted radiological studies.  Independently interpreted by me and my independent review of findings can be found in the ED Course.  See dictation for official radiology interpretation.      PROCEDURES    Procedures        MEDICATIONS GIVEN IN ER    Medications   morphine injection 4 mg (4 mg Intravenous Given 2/11/25 0954)   ondansetron (ZOFRAN) injection 4 mg (4 mg Intravenous Given 2/11/25 0953)   sodium chloride 0.9 % bolus 1,000 mL (0 mL Intravenous Stopped 2/11/25 1301)   morphine injection 4 mg (4 mg Intravenous Given 2/11/25 1122)   HYDROcodone-acetaminophen (NORCO)  MG per tablet 1 tablet (1 tablet Oral Given 2/11/25 1300)         PROGRESS, DATA ANALYSIS, CONSULTS, AND MEDICAL DECISION MAKING    Please note that this section constitutes my independent interpretation of clinical data including lab results, radiology, EKG's.  This constitutes my independent professional opinion regarding differential diagnosis and management of this patient.  It may include any factors such as history from outside sources, review of external records, social determinants of health, management of medications, response to those treatments, and discussions with other providers.    Differential Diagnosis and Plan: Initial concern for acute urolithiasis with renal colic, UTI, hemorrhagic cystitis, appendicitis, colitis, diverticulitis, renal failure, among others.  Plan for labs, CT scan, supportive care, reevaluation with results.    Additional sources:  - Discussed/ obtained information from independent  historians:       - (Social Determinants of Health): None     - Shared decision making:  Patient fully updated on and in agreement with the course and plan moving forward    ED Course as of 02/11/25 1308   Tue Feb 11, 2025   0953 WBC: 9.85 [DC]   0953 Hemoglobin: 14.5 [DC]   0953 Platelets: 182 [DC]   1036 Glucose(!): 129 [DC]   1036 BUN: 15 [DC]   1036 Creatinine: 1.07 [DC]   1036 Sodium: 137 [DC]   1036 Potassium: 3.5 [DC]   1036 Lipase: 58 [DC]   1036 Bacteria, UA: None Seen [DC]   1036 WBC, UA(!): 6-10 [DC]   1036 RBC, UA(!): Too Numerous to Count [DC]   1036 Nitrite, UA: Negative [DC]   1036 Leukocytes, UA(!): Small (1+) [DC]   1036 Blood, UA(!): Large (3+) [DC]   1036 Ketones, UA: Negative [DC]   1036 CT Abdomen Pelvis Stone Protocol  Per my independent interpretation of the CT abdomen pelvis, no evidence of cholelithiasis or cholecystitis, does have some right mild hydronephrosis, does have a proximal right ureteral stone, no overtly evident appendicitis, subtle finding could be missed will defer to final radiology report [DC]   1115 Patient updated on findings today, he did have improvement in pain, however, is having recurrence of pain and vomiting now.  Will give a second dose of pain medications and continue to monitor, may need hospitalization for further pain control. [DC]   1214 Reevaluation, pain controlled again, I discussed observation versus discharge with outpatient follow-up, he is comfortable with plan for discharge with ongoing pain control at home with Flomax, Zofran, and hydrocodone.  Supportive care measures discussed.  Outpatient urology follow-up discussed.  ED return for worsening symptoms as needed. [DC]      ED Course User Index  [DC] Kang Jin MD       Hospitalization Considered?: yes    Orders Placed During This Visit:  Orders Placed This Encounter   Procedures    CT Abdomen Pelvis Stone Protocol    Comprehensive Metabolic Panel    Lipase    Urinalysis With Microscopic If  Indicated (No Culture) - Urine, Clean Catch    CBC Auto Differential    Urinalysis, Microscopic Only - Urine, Clean Catch    CBC & Differential       Additional orders considered but not placed:      Independent interpretation of labs, radiology studies, and discussions with consultants: See ED Course        AS OF 13:08 EST VITALS:    BP - 137/79  HR - 60  TEMP - 97.6 °F (36.4 °C) (Oral)  02 SATS - 100%          DIAGNOSIS  Final diagnoses:   Calculus of ureter   Renal colic on right side   Chronic anticoagulation         DISPOSITION  ED Disposition       ED Disposition   Discharge    Condition   Stable    Comment   --                Please note that portions of this document were completed with a voice recognition program.    Note Disclaimer: At [facility], we believe that sharing information builds trust and better relationships. You are receiving this note because you recently visited [facility]. It is possible you will see health information before a provider has talked with you about it. This kind of information can be easy to misunderstand. To help you fully understand what it means for your health, we urge you to discuss this note with your provider.                       Kang Jin MD  02/11/25 4979

## 2025-02-11 NOTE — PROGRESS NOTES
"    I N T E R N A L  M E D I C I N E  Regi Tom, APRN      ENCOUNTER DATE:  02/13/2025    Zhao WILLARD Day / 59 y.o. / male    CHIEF COMPLAINT     Annual Exam    Recently seen in ER on February 11, 2025 with finding of renal colic on right side, calculus of ureter.  He is currently without any pain or urinary symptoms.  Will follow up with [facility] tomorrow.    Will see cardiology on February 18, 2025.    HLD: Now taking rosuvastatin 20 mg nightly with benefit; denies any side effects.  December 2024 lipid panel with elevated ; elevated triglycerides 181.  Working towards improved dietary and exercise habits.  January 2025 CT Cardiac Calcium score of 162.  He will be following up with cardiology on February 18, 2025 to help guide medication management; consideration of aspirin 81 mg daily.  Incidental finding on CT Cardiac Score with oblong nodular densities of right lower lobe with scarring/ volume loss at right lung base.  He attributes this to his prior history of PE.  Last CT chest imaging was around 2007/2008 in [place] - records unavailable.  He denies any chest pain, dyspnea and declines any follow up CT Chest imaging.      Formerly followed by hematology/ oncology, Dr. Patterson, for history of DVT, PE and factor V Leiden mutation.  Last seen October 1, 2024.  Recommended to continue Xarelto 10 mg daily indefinitely and follow up with PCP for future refills.  May be seen in clinic PRN.      Prediabetes: December 2024 A1C 5.7.  Eating less carbohydrates and is exercising regularly 4x weekly.    He plans on completing Cologuard.       December 2024 PSA 0.163      VITALS     Visit Vitals  /76   Pulse 56   Temp 97.9 °F (36.6 °C)   Ht 175.3 cm (69\")   Wt 95.7 kg (211 lb)   SpO2 95%   BMI 31.16 kg/m²       BP Readings from Last 3 Encounters:   02/13/25 122/76   02/11/25 137/79   12/13/24 140/82     Wt Readings from Last 3 Encounters:   02/13/25 95.7 kg (211 lb)   12/13/24 96.2 kg (212 lb)   10/01/24 " 96.5 kg (212 lb 11.2 oz)      Body mass index is 31.16 kg/m².      MEDICATIONS     Current Outpatient Medications on File Prior to Visit   Medication Sig Dispense Refill    cyclobenzaprine (FLEXERIL) 10 MG tablet Take 1 tablet by mouth 3 (Three) Times a Day As Needed for Muscle Spasms. 30 tablet 0    HYDROcodone-acetaminophen (NORCO)  MG per tablet Take 1 tablet by mouth Every 4 (Four) Hours As Needed for Severe Pain. 15 tablet 0    naloxone (NARCAN) 4 MG/0.1ML nasal spray Call 911. Don't prime. [place] in 1 nostril for overdose. Repeat in 2-3 minutes in other nostril if no or minimal breathing/responsiveness. 2 each 0    ondansetron ODT (ZOFRAN-ODT) 4 MG disintegrating tablet Place 1 tablet on the tongue Every 8 (Eight) Hours As Needed for Nausea or Vomiting for up to 5 days. 15 tablet 0    rivaroxaban (Xarelto) 10 MG tablet Take 1 tablet by mouth Daily. 90 tablet 2    rosuvastatin (CRESTOR) 20 MG tablet Take 1 tablet by mouth Every Night. 90 tablet 0    tamsulosin (FLOMAX) 0.4 MG capsule 24 hr capsule Take 1 capsule by mouth Daily. 30 capsule 0    terbinafine (lamiSIL) 250 MG tablet TAKE 1 TABLET BY MOUTH EVERY DAY 60 tablet 0     No current facility-administered medications on file prior to visit.         HISTORY OF PRESENT ILLNESS      Zhao presents for annual health maintenance visit.    General health: good  Lifestyle:  Attempting to lose weight?: Yes   Diet: eating healthier  Exercise: exercises 4 days weekly. Lifting weights, some treadmill  Tobacco: Never used   Alcohol: 2 days/week and 2 drinks/occasion  Work: Full-time  Reproductive health:  Sexually active?: Yes   Concern for STD?: No   Sexual problems?: No problems   Sees Urologist?: Yes, tomorrow, for kidney stones  Depression Screenin/13/2025     3:25 PM   PHQ-2/PHQ-9 Depression Screening   Little interest or pleasure in doing things Not at all   Feeling down, depressed, or hopeless Not at all   How difficult have these problems  made it for you to do your work, take care of things at home, or get along with other people? Not difficult at all         PHQ-2: 0 (Not depressed)     PHQ-9: 0 (Negative screening for depression)    Patient Care Team:  Regi Tom APRN as PCP - General (Family Medicine)  Max Lees Jr., MD as Referring Physician (Orthopedic Surgery)  Juanjo Patterson MD as Consulting Physician (Hematology and Oncology)  Jarrell Pope MD as Consulting Physician (Cardiology)  ______________________________________________________________________    ALLERGIES  No Known Allergies     PFSH:     The following portions of the patient's history were reviewed and updated as appropriate: Allergies / Current Medications / Past Medical History / Surgical History / Social History / Family History    PROBLEM LIST   Patient Active Problem List   Diagnosis    Partial tear of right Achilles tendon    Rupture of right gastrocnemius tendon    Prediabetes    Pure hypercholesterolemia    Calculus of ureter    Coronary artery disease due to lipid rich plaque       PAST MEDICAL HISTORY  Past Medical History:   Diagnosis Date    Acromioclavicular separation 2003    Arthritis of back 2010    Osgood-Schlatter's disease     Pulmonary embolism May 15 2006    Factor V Leiden       SURGICAL HISTORY  Past Surgical History:   Procedure Laterality Date    HAND SURGERY  1980    Right wrist Cyst removal    KNEE SURGERY  2007    Left ACL reconstruction       SOCIAL HISTORY  Social History     Socioeconomic History    Marital status:    Tobacco Use    Smoking status: Never     Passive exposure: Never    Smokeless tobacco: Never   Vaping Use    Vaping status: Never Used   Substance and Sexual Activity    Alcohol use: Yes     Alcohol/week: 4.0 standard drinks of alcohol     Types: 4 Drinks containing 0.5 oz of alcohol per week     Comment: 3-4 drinks per week    Drug use: Never    Sexual activity: Yes     Partners: Female     Birth control/protection:  None, Vasectomy       FAMILY HISTORY  Family History   Problem Relation Age of Onset    Hyperlipidemia Father         Living    Hypertension Father     Coronary artery disease Maternal Grandfather     Alzheimer's disease Maternal Grandfather     Coronary artery disease Paternal Grandfather        IMMUNIZATION HISTORY  Immunization History   Administered Date(s) Administered    COVID-19 (PFIZER) Purple Cap Monovalent 03/25/2021, 04/16/2021         REVIEW OF SYSTEMS     Review of Systems   Constitutional:  Negative for chills, fever and unexpected weight change.   Respiratory:  Negative for cough, chest tightness and shortness of breath.    Cardiovascular:  Negative for chest pain, palpitations and leg swelling.   Neurological:  Negative for dizziness, weakness, light-headedness and headaches.   Psychiatric/Behavioral:  Positive for sleep disturbance (Snoring). The patient is not nervous/anxious.        PHYSICAL EXAMINATION     Physical Exam  Vitals reviewed.   Constitutional:       General: He is not in acute distress.     Appearance: Normal appearance. He is not ill-appearing, toxic-appearing or diaphoretic.   HENT:      Head: Normocephalic and atraumatic.      Right Ear: Tympanic membrane, ear canal and external ear normal. There is no impacted cerumen.      Left Ear: Tympanic membrane, ear canal and external ear normal. There is no impacted cerumen.      Nose: Nose normal. No congestion or rhinorrhea.      Mouth/Throat:      Mouth: Mucous membranes are moist.      Pharynx: Oropharynx is clear. No oropharyngeal exudate or posterior oropharyngeal erythema.   Eyes:      Extraocular Movements: Extraocular movements intact.      Conjunctiva/sclera: Conjunctivae normal.      Pupils: Pupils are equal, round, and reactive to light.   Cardiovascular:      Rate and Rhythm: Normal rate and regular rhythm.      Heart sounds: Normal heart sounds.   Pulmonary:      Effort: Pulmonary effort is normal. No respiratory distress.     "  Breath sounds: Normal breath sounds.   Abdominal:      General: Bowel sounds are normal.      Palpations: Abdomen is soft.      Tenderness: There is no abdominal tenderness.   Musculoskeletal:         General: Normal range of motion.      Cervical back: Normal range of motion and neck supple.      Right lower leg: No edema.      Left lower leg: No edema.   Lymphadenopathy:      Cervical: No cervical adenopathy.   Skin:     General: Skin is warm and dry.   Neurological:      General: No focal deficit present.      Mental Status: He is alert and oriented to person, place, and time. Mental status is at baseline.   Psychiatric:         Mood and Affect: Mood normal.         Behavior: Behavior normal.         Thought Content: Thought content normal.         Judgment: Judgment normal.         REVIEWED DATA      Labs:    Lab Results   Component Value Date     02/11/2025    K 3.5 02/11/2025    CALCIUM 8.9 02/11/2025    AST 26 02/11/2025    ALT 36 02/11/2025    BUN 15 02/11/2025    CREATININE 1.07 02/11/2025    CREATININE 1.10 01/20/2025    CREATININE 1.11 12/17/2024    EGFRIFAFRI >60 12/15/2022       Lab Results   Component Value Date    GLUCOSE 129 (H) 02/11/2025    HGBA1C 5.70 (H) 12/17/2024    HGBA1C 5.7 (H) 05/17/2022    HGBA1C 5.5 10/27/2020    TSH 3.020 12/17/2024    FREET4 1.06 12/17/2024       Lab Results   Component Value Date    PSA 0.163 12/17/2024    PSA 0.17 05/17/2022    PSA 1.08 10/27/2020       [unfilled]    Lab Results   Component Value Date     (H) 12/17/2024    HDL 45 12/17/2024    TRIG 181 (H) 12/17/2024    CHOLHDLRATIO 6.16 12/17/2024       No components found for: \"[id number]\"    Lab Results   Component Value Date    WBC 9.85 02/11/2025    HGB 14.5 02/11/2025    MCV 89.1 02/11/2025     02/11/2025       Lab Results   Component Value Date    PROTEIN Trace (A) 12/17/2024    GLUCOSEU Negative 02/11/2025    BLOODU Large (3+) (A) 02/11/2025    NITRITEU Negative 02/11/2025    LEUKOCYTESUR " Small (1+) (A) 02/11/2025          Lab Results   Component Value Date    HEPCVIRUSABY Non Reactive 12/17/2024       Imaging:           Medical Tests:           ASSESSMENT & PLAN     ANNUAL WELLNESS EXAM / PHYSICAL     Diagnoses and all orders for this visit:    1. Annual physical exam (Primary)    2. Pure hypercholesterolemia    3. Coronary artery disease due to lipid rich plaque    4. Prediabetes    5. Sleep disturbance  -     Ambulatory Referral to Sleep Medicine    6. Calculus of ureter         Summary/Discussion:     Continue healthy low carb/ low fat diet, exercise habits and statin as prescribed.  Plan to recheck fasting lipid panel/ CMP in 3 months.  Follow up with cardiology as scheduled.  Rule out BHAVESH with sleep medicine referral.    Follow up with [facility] as scheduled tomorrow.      Next Appointment with me: Visit date not found    Return in about 4 months (around 6/13/2025) for Chronic care, 1 year annual physical.      HEALTHCARE MAINTENANCE ISSUES       Cancer Screening:  Colon: Initial/Next screening at age: -Cologuard and DUE NOW  Repeat colon cancer screening: N/A at this time  Prostate: No screening needed at this time  Testicular: Recommended monthly self exam  Skin: Monthly self skin examination, annual exam by health professional  Lung: Does not meet criteria for lung cancer screening.   Other:    Screening Labs & Tests:  Lab results reviewed & discussed with with patient or orders placed today.  EKG:  CV Screening: Lipid panel  DEXA (75+ or risk factors):   HEP C (If born [birth year] or risk factors): Previously had negative screen  Other:     Immunization/Vaccinations (to be given today unless deferred by patient)  Influenza: Recommended annual influenza vaccine  Hepatitis A: Verify immunization records  Hepatitis B: Verify immunization records  Tetanus/Pertussis: Recommended here or at pharmacy  Pneumovax/PCV: Recommended here or at pharmacy  Shingles: Recommended Shingrix vaccine  COVID:  Does not plan to get the latest booster  Lifestyle Counseling:  Lifestyle Modifications: Attempt to lose weight, Continue good lifestyle choices/modifications, Maintain a low sugar/carbohydrate diet, Follow a low fat, low cholesterol diet, Maintain low sodium diet (< 3 gm) for blood pressure, and Reduce exposure to stress if possible  Safety Issues: Always wear seatbelt, Avoid texting while driving   Use sunscreen, regular skin examination  Recommended annual dental/vision examination.  Emotional/Stress/Sleep: Reviewed and  given when appropriate      Health Maintenance   Topic Date Due    BMI FOLLOWUP  Never done    COLORECTAL CANCER SCREENING  05/31/2025    ZOSTER VACCINE (1 of 2) 02/13/2025 (Originally 9/6/2016)    COVID-19 Vaccine (3 - 2024-25 season) 02/15/2025 (Originally 9/1/2024)    INFLUENZA VACCINE  03/31/2025 (Originally 7/1/2024)    Pneumococcal Vaccine 50+ (1 of 1 - PCV) 02/13/2026 (Originally 9/6/2016)    TDAP/TD VACCINES (2 - Td or Tdap) 02/13/2026 (Originally 4/11/2021)    LIPID PANEL  12/17/2025    ANNUAL PHYSICAL  02/13/2026    HEPATITIS C SCREENING  Completed

## 2025-02-11 NOTE — ED TRIAGE NOTES
Patient to ED via EMS from work c/o right flank pain that began yesterday. Patient had one occurrence of vomiting with EMS.

## 2025-02-11 NOTE — DISCHARGE INSTRUCTIONS
The medications as prescribed, stay well-hydrated, close outpatient urology follow-up as discussed, ED return for worsening symptoms as needed.

## 2025-02-13 ENCOUNTER — OFFICE VISIT (OUTPATIENT)
Dept: INTERNAL MEDICINE | Age: 60
End: 2025-02-13
Payer: COMMERCIAL

## 2025-02-13 VITALS
HEIGHT: 69 IN | BODY MASS INDEX: 31.25 KG/M2 | TEMPERATURE: 97.9 F | HEART RATE: 56 BPM | SYSTOLIC BLOOD PRESSURE: 122 MMHG | DIASTOLIC BLOOD PRESSURE: 76 MMHG | OXYGEN SATURATION: 95 % | WEIGHT: 211 LBS

## 2025-02-13 DIAGNOSIS — E78.00 PURE HYPERCHOLESTEROLEMIA: ICD-10-CM

## 2025-02-13 DIAGNOSIS — I25.83 CORONARY ARTERY DISEASE DUE TO LIPID RICH PLAQUE: ICD-10-CM

## 2025-02-13 DIAGNOSIS — N20.1 CALCULUS OF URETER: ICD-10-CM

## 2025-02-13 DIAGNOSIS — I25.10 CORONARY ARTERY DISEASE DUE TO LIPID RICH PLAQUE: ICD-10-CM

## 2025-02-13 DIAGNOSIS — R73.03 PREDIABETES: ICD-10-CM

## 2025-02-13 DIAGNOSIS — G47.9 SLEEP DISTURBANCE: ICD-10-CM

## 2025-02-13 DIAGNOSIS — Z00.00 ANNUAL PHYSICAL EXAM: Primary | ICD-10-CM

## 2025-02-13 PROCEDURE — 99396 PREV VISIT EST AGE 40-64: CPT

## 2025-02-18 ENCOUNTER — OFFICE VISIT (OUTPATIENT)
Dept: CARDIOLOGY | Facility: CLINIC | Age: 60
End: 2025-02-18
Payer: COMMERCIAL

## 2025-02-18 VITALS
BODY MASS INDEX: 30.72 KG/M2 | DIASTOLIC BLOOD PRESSURE: 70 MMHG | HEIGHT: 69 IN | WEIGHT: 207.4 LBS | OXYGEN SATURATION: 98 % | HEART RATE: 68 BPM | SYSTOLIC BLOOD PRESSURE: 115 MMHG

## 2025-02-18 DIAGNOSIS — I25.10 CORONARY ARTERY DISEASE DUE TO LIPID RICH PLAQUE: Primary | ICD-10-CM

## 2025-02-18 DIAGNOSIS — E78.00 PURE HYPERCHOLESTEROLEMIA: ICD-10-CM

## 2025-02-18 DIAGNOSIS — I25.83 CORONARY ARTERY DISEASE DUE TO LIPID RICH PLAQUE: Primary | ICD-10-CM

## 2025-02-18 DIAGNOSIS — I26.99 PULMONARY EMBOLISM, UNSPECIFIED CHRONICITY, UNSPECIFIED PULMONARY EMBOLISM TYPE, UNSPECIFIED WHETHER ACUTE COR PULMONALE PRESENT: ICD-10-CM

## 2025-02-18 PROCEDURE — 93000 ELECTROCARDIOGRAM COMPLETE: CPT | Performed by: STUDENT IN AN ORGANIZED HEALTH CARE EDUCATION/TRAINING PROGRAM

## 2025-02-18 PROCEDURE — 99204 OFFICE O/P NEW MOD 45 MIN: CPT | Performed by: STUDENT IN AN ORGANIZED HEALTH CARE EDUCATION/TRAINING PROGRAM

## 2025-02-18 RX ORDER — ROSUVASTATIN CALCIUM 40 MG/1
40 TABLET, COATED ORAL NIGHTLY
Qty: 90 TABLET | Refills: 3 | Status: SHIPPED | OUTPATIENT
Start: 2025-02-18

## 2025-02-18 NOTE — PROGRESS NOTES
"[facility]      Patient Name: Zhao Yeh  :1965  Age: 59 y.o.  Encounter Provider:  Jarrell Pope MD      Chief Complaint: CAD        HPI  Zhao Yeh is a 59 y.o. male who presents today for evaluation after a positive coronary calcium score. Pt has a  history significant for factor V Leiden, pulmonary embolism several years ago with possible pulmonary infarction, prediabetes, hyperlipidemia.  He had a coronary artery calcium score done that was 162.  He has no cardiovascular complaints.  He lost weight several times per week.  He tore his Achilles last year and is now slowly reintroduce some cardio which has not given him any issues.  Denies any chest pain or significant shortness of breath.  No lower extreme edema.  No orthopnea or PND.  He works in [employer].      The following portions of the patient's history were reviewed and updated as appropriate: allergies, current medications, past family history, past medical history, past social history, past surgical history and problem list.      Previous Cardiac Testing:  Coronary calcium score 162    OBJECTIVE:   Vital Signs  Vitals:    25 0905   BP: 115/70   Pulse: 68   SpO2: 98%     Estimated body mass index is 30.63 kg/m² as calculated from the following:    Height as of this encounter: 175.3 cm (69\").    Weight as of this encounter: 94.1 kg (207 lb 6.4 oz).    Constitutional:       Appearance: Healthy appearance. Not in distress.   Neck:      Vascular: JVD normal.   Pulmonary:      Effort: Pulmonary effort is normal.      Breath sounds: Normal breath sounds. No wheezing. No rhonchi. No rales.   Cardiovascular:      PMI at left midclavicular line. Normal rate. Regular rhythm. Normal S1. Normal S2.       Murmurs: There is no murmur.      No gallop.  No click. No rub.   Pulses:     Intact distal pulses.   Edema:     Peripheral edema absent.   Abdominal:      Palpations: Abdomen is soft.      Tenderness: There is no " abdominal tenderness.   Musculoskeletal: Normal range of motion. Skin:     General: Skin is warm and dry.   Neurological:      General: No focal deficit present.      Mental Status: Alert and oriented to person, place and time.           ECG 12 Lead    Date/Time: 2/18/2025 10:37 AM  Performed by: Jarrell Pope MD    Authorized by: Jarrell Pope MD  Rhythm: sinus rhythm  Rate: normal  Conduction: conduction normal  ST Segments: ST segments normal  T Waves: T waves normal  QRS axis: normal  Other findings: non-specific ST-T wave changes    Clinical impression: non-specific ECG          Lipid Panel          12/17/2024    07:58   Lipid Panel   Total Cholesterol 277    Triglycerides 181    HDL Cholesterol 45    VLDL Cholesterol 34    LDL Cholesterol  198         BUN   Date Value Ref Range Status   02/11/2025 15 6 - 20 mg/dL Final   12/15/2022 19 8 - 26 mg/dL Final     Creatinine   Date Value Ref Range Status   02/11/2025 1.07 0.76 - 1.27 mg/dL Final   12/15/2022 1.07 0.73 - 1.18 mg/dL Final     Potassium   Date Value Ref Range Status   02/11/2025 3.5 3.5 - 5.2 mmol/L Final   12/15/2022 4.2 3.5 - 5.1 mmol/L Final     ALT (SGPT)   Date Value Ref Range Status   02/11/2025 36 1 - 41 U/L Final   05/17/2022 41 10 - 50 U/L Final     AST (SGOT)   Date Value Ref Range Status   02/11/2025 26 1 - 40 U/L Final   05/17/2022 26 10 - 50 U/L Final           ASSESSMENT:      Diagnosis Plan   1. Coronary artery disease due to lipid rich plaque        2. Pure hypercholesterolemia  rosuvastatin (CRESTOR) 40 MG tablet    Lipid Panel    Comprehensive Metabolic Panel    Hemoglobin A1c      3. Pulmonary embolism, unspecified chronicity, unspecified pulmonary embolism type, unspecified whether acute cor pulmonale present              PLAN OF CARE:     CAD  Hyperlipidemia  Coronary artery calcium score of 162 representing the 64th percentile for his age, gender and race.  He is asymptomatic at this time.  LDL was 198.  He has tolerated  Crestor 20 mg.  I will increase this to 40 mg and schedule him for a follow-up appointment in 3 to 4 months to reassess his lipid panel and add a second agent if needed.  Ideally LDL less than 70   Prediabetes  History of PE Xarelto  Factor V Leiden      Return to clinic in 3 to 4 months, repeat labs before follow-up       Discharge Medications            Accurate as of February 18, 2025 10:36 AM. If you have any questions, ask your nurse or doctor.                Changes to Medications        Instructions Start Date   rosuvastatin 40 MG tablet  Commonly known as: CRESTOR  What changed:   medication strength  how much to take  Changed by: Jarrell Pope   40 mg, Oral, Nightly             Continue These Medications        Instructions Start Date   cyclobenzaprine 10 MG tablet  Commonly known as: FLEXERIL   10 mg, Oral, 3 Times Daily PRN      HYDROcodone-acetaminophen  MG per tablet  Commonly known as: NORCO   1 tablet, Oral, Every 4 Hours PRN      naloxone 4 MG/0.1ML nasal spray  Commonly known as: NARCAN   Call 911. Don't prime. [place] in 1 nostril for overdose. Repeat in 2-3 minutes in other nostril if no or minimal breathing/responsiveness.      rivaroxaban 10 MG tablet  Commonly known as: Xarelto   10 mg, Oral, Daily      tamsulosin 0.4 MG capsule 24 hr capsule  Commonly known as: FLOMAX   0.4 mg, Oral, Daily      terbinafine 250 MG tablet  Commonly known as: lamiSIL   250 mg, Oral, Daily               Thank you for allowing me to participate in the care of your patient,      Sincerely,   Jarrell Pope MD  [facility]  02/18/25  10:36 EST

## 2025-02-25 ENCOUNTER — OFFICE VISIT (OUTPATIENT)
Dept: INTERNAL MEDICINE | Age: 60
End: 2025-02-25
Payer: COMMERCIAL

## 2025-02-25 VITALS
HEART RATE: 63 BPM | OXYGEN SATURATION: 96 % | SYSTOLIC BLOOD PRESSURE: 134 MMHG | TEMPERATURE: 97.4 F | WEIGHT: 211 LBS | BODY MASS INDEX: 31.25 KG/M2 | DIASTOLIC BLOOD PRESSURE: 82 MMHG | HEIGHT: 69 IN

## 2025-02-25 DIAGNOSIS — Z87.442 HISTORY OF KIDNEY STONES: Primary | ICD-10-CM

## 2025-02-25 PROCEDURE — 99212 OFFICE O/P EST SF 10 MIN: CPT

## 2025-02-25 NOTE — PROGRESS NOTES
"    I N T E R N A L  M E D I C I N E  Regi Tom, APRN    ENCOUNTER DATE:  02/25/2025    Zhao WILLARD Day / 59 y.o. / male      CHIEF COMPLAINT / REASON FOR OFFICE VISIT     history of kidney stones      ASSESSMENT & PLAN     Diagnoses and all orders for this visit:    1. History of kidney stones (Primary)         SUMMARY/DISCUSSION  FMLA paperwork completed per patient's request.  Continue to follow with [facility] yearly as recommended.      12 minutes spent with patient, completing paperwork, documentation.    Next Appointment with me: Visit date not found    Return for Return in about 4 months (around 6/13/2025) for Chronic care, 1 year annual physical..      VITAL SIGNS     Visit Vitals  /82   Pulse 63   Temp 97.4 °F (36.3 °C)   Ht 175.3 cm (69\")   Wt 95.7 kg (211 lb)   SpO2 96%   BMI 31.16 kg/m²             Wt Readings from Last 3 Encounters:   02/25/25 95.7 kg (211 lb)   02/18/25 94.1 kg (207 lb 6.4 oz)   02/13/25 95.7 kg (211 lb)     Body mass index is 31.16 kg/m².        MEDICATIONS AT THE TIME OF OFFICE VISIT     Current Outpatient Medications on File Prior to Visit   Medication Sig Dispense Refill    cyclobenzaprine (FLEXERIL) 10 MG tablet Take 1 tablet by mouth 3 (Three) Times a Day As Needed for Muscle Spasms. 30 tablet 0    naloxone (NARCAN) 4 MG/0.1ML nasal spray Call 911. Don't prime. [place] in 1 nostril for overdose. Repeat in 2-3 minutes in other nostril if no or minimal breathing/responsiveness. 2 each 0    rivaroxaban (Xarelto) 10 MG tablet Take 1 tablet by mouth Daily. 90 tablet 2    rosuvastatin (CRESTOR) 40 MG tablet Take 1 tablet by mouth Every Night. 90 tablet 3    tamsulosin (FLOMAX) 0.4 MG capsule 24 hr capsule Take 1 capsule by mouth Daily. 30 capsule 0    terbinafine (lamiSIL) 250 MG tablet TAKE 1 TABLET BY MOUTH EVERY DAY 60 tablet 0    [DISCONTINUED] HYDROcodone-acetaminophen (NORCO)  MG per tablet Take 1 tablet by mouth Every 4 (Four) Hours As Needed for Severe Pain. 15 " tablet 0     No current facility-administered medications on file prior to visit.        HISTORY OF PRESENT ILLNESS     Here requesting FMLA paperwork completed in view of his recent ER visit for kidney stones.  He returned to work on February 17, 2025.  Now fully recovered, feeling well.  He completed follow up with [facility] without any intervention needed.  He will next follow up in 1 year.      Patient Care Team:  Regi Tom APRN as PCP - General (Family Medicine)  Max Lees Jr., MD as Referring Physician (Orthopedic Surgery)  Juanjo Patterson MD as Consulting Physician (Hematology and Oncology)  Jarrell Pope MD as Consulting Physician (Cardiology)    REVIEW OF SYSTEMS     Review of Systems   Constitutional:  Negative for chills, fever and unexpected weight change.   Respiratory:  Negative for cough, chest tightness and shortness of breath.    Cardiovascular:  Negative for chest pain, palpitations and leg swelling.   Neurological:  Negative for dizziness, weakness, light-headedness and headaches.   Psychiatric/Behavioral:  The patient is not nervous/anxious.           PHYSICAL EXAMINATION     Physical Exam  Vitals reviewed.   Constitutional:       General: He is not in acute distress.     Appearance: Normal appearance. He is not ill-appearing, toxic-appearing or diaphoretic.   HENT:      Head: Normocephalic and atraumatic.   Cardiovascular:      Rate and Rhythm: Normal rate and regular rhythm.      Heart sounds: Normal heart sounds.   Pulmonary:      Effort: Pulmonary effort is normal.      Breath sounds: Normal breath sounds.   Neurological:      Mental Status: He is alert and oriented to person, place, and time. Mental status is at baseline.   Psychiatric:         Mood and Affect: Mood normal.         Behavior: Behavior normal.         Thought Content: Thought content normal.         Judgment: Judgment normal.           REVIEWED DATA     Labs:           Imaging:            Medical Tests:            Summary of old records / correspondence / consultant report:           Request outside records:

## 2025-05-05 DIAGNOSIS — E78.00 PURE HYPERCHOLESTEROLEMIA: ICD-10-CM

## 2025-05-05 RX ORDER — ROSUVASTATIN CALCIUM 20 MG/1
20 TABLET, COATED ORAL
Qty: 90 TABLET | Refills: 0 | OUTPATIENT
Start: 2025-05-05

## 2025-06-26 ENCOUNTER — OFFICE VISIT (OUTPATIENT)
Dept: INTERNAL MEDICINE | Age: 60
End: 2025-06-26
Payer: COMMERCIAL

## 2025-06-26 ENCOUNTER — LAB (OUTPATIENT)
Facility: HOSPITAL | Age: 60
End: 2025-06-26
Payer: COMMERCIAL

## 2025-06-26 VITALS
HEART RATE: 51 BPM | OXYGEN SATURATION: 97 % | SYSTOLIC BLOOD PRESSURE: 144 MMHG | TEMPERATURE: 97.7 F | BODY MASS INDEX: 30.51 KG/M2 | HEIGHT: 69 IN | WEIGHT: 206 LBS | DIASTOLIC BLOOD PRESSURE: 86 MMHG

## 2025-06-26 DIAGNOSIS — R73.03 PREDIABETES: ICD-10-CM

## 2025-06-26 DIAGNOSIS — E78.00 PURE HYPERCHOLESTEROLEMIA: ICD-10-CM

## 2025-06-26 DIAGNOSIS — Z12.83 SKIN EXAM FOR MALIGNANT NEOPLASM: ICD-10-CM

## 2025-06-26 DIAGNOSIS — R03.0 ELEVATED BLOOD PRESSURE READING WITHOUT DIAGNOSIS OF HYPERTENSION: Primary | ICD-10-CM

## 2025-06-26 LAB
ALBUMIN SERPL-MCNC: 4.4 G/DL (ref 3.5–5.2)
ALBUMIN/GLOB SERPL: 1.4 G/DL
ALP SERPL-CCNC: 72 U/L (ref 39–117)
ALT SERPL W P-5'-P-CCNC: 30 U/L (ref 1–41)
ANION GAP SERPL CALCULATED.3IONS-SCNC: 12.8 MMOL/L (ref 5–15)
AST SERPL-CCNC: 30 U/L (ref 1–40)
BILIRUB SERPL-MCNC: 0.5 MG/DL (ref 0–1.2)
BUN SERPL-MCNC: 15 MG/DL (ref 6–20)
BUN/CREAT SERPL: 11.3 (ref 7–25)
CALCIUM SPEC-SCNC: 9.3 MG/DL (ref 8.6–10.5)
CHLORIDE SERPL-SCNC: 104 MMOL/L (ref 98–107)
CHOLEST SERPL-MCNC: 162 MG/DL (ref 0–200)
CO2 SERPL-SCNC: 23.2 MMOL/L (ref 22–29)
CREAT SERPL-MCNC: 1.33 MG/DL (ref 0.76–1.27)
EGFRCR SERPLBLD CKD-EPI 2021: 62 ML/MIN/1.73
GLOBULIN UR ELPH-MCNC: 3.1 GM/DL
GLUCOSE SERPL-MCNC: 106 MG/DL (ref 65–99)
HBA1C MFR BLD: 5.6 % (ref 4.8–5.6)
HDLC SERPL-MCNC: 63 MG/DL (ref 40–60)
LDLC SERPL CALC-MCNC: 85 MG/DL (ref 0–100)
LDLC/HDLC SERPL: 1.35 {RATIO}
POTASSIUM SERPL-SCNC: 4.5 MMOL/L (ref 3.5–5.2)
PROT SERPL-MCNC: 7.5 G/DL (ref 6–8.5)
SODIUM SERPL-SCNC: 140 MMOL/L (ref 136–145)
TRIGL SERPL-MCNC: 70 MG/DL (ref 0–150)
VLDLC SERPL-MCNC: 14 MG/DL (ref 5–40)

## 2025-06-26 PROCEDURE — 80061 LIPID PANEL: CPT

## 2025-06-26 PROCEDURE — 36415 COLL VENOUS BLD VENIPUNCTURE: CPT

## 2025-06-26 PROCEDURE — 83036 HEMOGLOBIN GLYCOSYLATED A1C: CPT

## 2025-06-26 PROCEDURE — 99214 OFFICE O/P EST MOD 30 MIN: CPT

## 2025-06-26 PROCEDURE — 80053 COMPREHEN METABOLIC PANEL: CPT

## 2025-06-26 NOTE — PROGRESS NOTES
"    I N T E R N A L  M E D I C I N E  Regi Tom, HATTIE    ENCOUNTER DATE:  06/26/2025    Zhao WILLARD Day / 59 y.o. / male      CHIEF COMPLAINT / REASON FOR OFFICE VISIT     Hyperlipidemia and Prediabetes      ASSESSMENT & PLAN     Diagnoses and all orders for this visit:    1. Elevated blood pressure reading without diagnosis of hypertension (Primary)    2. Pure hypercholesterolemia    3. Prediabetes    4. Skin exam for malignant neoplasm  -     Ambulatory Referral to Dermatology         SUMMARY/DISCUSSION  Today's BP is elevated.  Drank strong coffee this morning.  He will purchase Omron Series 3 BP cuff and start recording daily values.  Agreeable to send me BP readings for review via "[employer]" in 1-2 weeks.    Reviewed importance of good BP control < 130/80.  Encouraged low sodium diet, reduce stress, regular exercise.  Update CMP/ lipid panel/ A1C to ensure LDL is improving, goal LDL is < 100.  May need to consider adding Zetia or Repatha.  Follow up with cardiology office as scheduled.  Requesting referral to dermatology for skin exam - referral placed.      Next Appointment with me: Visit date not found    Return for 6 month chronic care..      VITAL SIGNS     Visit Vitals  /86   Pulse 51   Temp 97.7 °F (36.5 °C)   Ht 175.3 cm (69\")   Wt 93.4 kg (206 lb)   SpO2 97%   BMI 30.42 kg/m²             Wt Readings from Last 3 Encounters:   06/26/25 93.4 kg (206 lb)   02/25/25 95.7 kg (211 lb)   02/18/25 94.1 kg (207 lb 6.4 oz)     Body mass index is 30.42 kg/m².        MEDICATIONS AT THE TIME OF OFFICE VISIT     Current Outpatient Medications on File Prior to Visit   Medication Sig Dispense Refill    rivaroxaban (Xarelto) 10 MG tablet Take 1 tablet by mouth Daily. 90 tablet 2    rosuvastatin (CRESTOR) 40 MG tablet Take 1 tablet by mouth Every Night. 90 tablet 3    cyclobenzaprine (FLEXERIL) 10 MG tablet Take 1 tablet by mouth 3 (Three) Times a Day As Needed for Muscle Spasms. 30 tablet 0    naloxone (NARCAN) 4 " MG/0.1ML nasal spray Call 911. Don't prime. [place] in 1 nostril for overdose. Repeat in 2-3 minutes in other nostril if no or minimal breathing/responsiveness. 2 each 0    tamsulosin (FLOMAX) 0.4 MG capsule 24 hr capsule Take 1 capsule by mouth Daily. 30 capsule 0    [DISCONTINUED] terbinafine (lamiSIL) 250 MG tablet TAKE 1 TABLET BY MOUTH EVERY DAY 60 tablet 0     No current facility-administered medications on file prior to visit.        HISTORY OF PRESENT ILLNESS     Last seen February 2025.      BMI 30: Weight is down 5 pounds since February 2025.    HLD: Remains on rosuvastatin 20 mg nightly and tolerating well - denies side effects.  December 2024 lipid panel with elevated ; elevated triglycerides 181.  Working towards improved dietary and exercise habits.  January 2025 CT Cardiac Calcium score of 162.  Next appointment with cardiology, Dr. Pope is July 1, 2025.      Prediabetes: December 2024 A1C 5.7.  Working out 4-5x weekly at gym - weight lifting and jogging some.  This has been helping with right shoulder pain.       June 2025 Cologuard negative.     December 2024 PSA 0.163      Patient Care Team:  Regi Tom APRN as PCP - General (Family Medicine)  Max Lees Jr., MD as Referring Physician (Orthopedic Surgery)  Juanjo Patterson MD as Consulting Physician (Hematology and Oncology)  Jarrell Pope MD as Consulting Physician (Cardiology)    REVIEW OF SYSTEMS     Review of Systems   Constitutional:  Negative for chills, fever and unexpected weight change.   Respiratory:  Negative for cough, chest tightness and shortness of breath.    Cardiovascular:  Negative for chest pain, palpitations and leg swelling.   Neurological:  Negative for dizziness, weakness, light-headedness and headaches.   Psychiatric/Behavioral:  The patient is not nervous/anxious.           PHYSICAL EXAMINATION     Physical Exam  Vitals reviewed.   Constitutional:       General: He is not in acute distress.      Appearance: Normal appearance. He is not ill-appearing, toxic-appearing or diaphoretic.   HENT:      Head: Normocephalic and atraumatic.   Cardiovascular:      Rate and Rhythm: Normal rate and regular rhythm.      Heart sounds: Normal heart sounds.   Pulmonary:      Effort: Pulmonary effort is normal.      Breath sounds: Normal breath sounds.   Musculoskeletal:      Right lower leg: No edema.      Left lower leg: No edema.   Neurological:      Mental Status: He is alert and oriented to person, place, and time. Mental status is at baseline.   Psychiatric:         Mood and Affect: Mood normal.         Behavior: Behavior normal.         Thought Content: Thought content normal.         Judgment: Judgment normal.           REVIEWED DATA     Labs:           Imaging:            Medical Tests:           Summary of old records / correspondence / consultant report:           Request outside records:

## 2025-06-27 ENCOUNTER — RESULTS FOLLOW-UP (OUTPATIENT)
Dept: CARDIOLOGY | Age: 60
End: 2025-06-27
Payer: COMMERCIAL

## 2025-06-27 DIAGNOSIS — R79.89 ELEVATED SERUM CREATININE: Primary | ICD-10-CM

## 2025-06-27 NOTE — TELEPHONE ENCOUNTER
Please let him know cholesterol and A1c have improved.  Continue rosuvastatin.  Kidney function considerably worse on these labs.  He is not on any medication that would cause this.  Would make sure he is hydrating well.  Would follow-up with PCP to have these labs rechecked to ensure this comes back to normal.  If this remains elevated may need nephrology eval.

## 2025-07-24 RX ORDER — RIVAROXABAN 10 MG/1
10 TABLET, FILM COATED ORAL DAILY
Qty: 90 TABLET | Refills: 2 | OUTPATIENT
Start: 2025-07-24

## 2025-07-29 DIAGNOSIS — E78.00 PURE HYPERCHOLESTEROLEMIA: ICD-10-CM

## 2025-07-29 DIAGNOSIS — Z86.718 HISTORY OF DVT (DEEP VEIN THROMBOSIS): Primary | ICD-10-CM

## 2025-07-29 RX ORDER — RIVAROXABAN 20 MG/1
20 TABLET, FILM COATED ORAL DAILY
Qty: 30 TABLET | Refills: 1 | OUTPATIENT
Start: 2025-07-29

## 2025-07-29 RX ORDER — ROSUVASTATIN CALCIUM 40 MG/1
40 TABLET, COATED ORAL NIGHTLY
Qty: 90 TABLET | Refills: 3 | Status: SHIPPED | OUTPATIENT
Start: 2025-07-29

## 2025-08-20 ENCOUNTER — OFFICE VISIT (OUTPATIENT)
Dept: CARDIOLOGY | Age: 60
End: 2025-08-20
Payer: COMMERCIAL

## 2025-08-20 VITALS
DIASTOLIC BLOOD PRESSURE: 90 MMHG | WEIGHT: 209 LBS | HEIGHT: 69 IN | HEART RATE: 53 BPM | BODY MASS INDEX: 30.96 KG/M2 | SYSTOLIC BLOOD PRESSURE: 160 MMHG

## 2025-08-20 DIAGNOSIS — I10 ESSENTIAL HYPERTENSION: Primary | ICD-10-CM

## 2025-08-20 DIAGNOSIS — I25.10 CORONARY ARTERY DISEASE DUE TO LIPID RICH PLAQUE: ICD-10-CM

## 2025-08-20 DIAGNOSIS — I25.83 CORONARY ARTERY DISEASE DUE TO LIPID RICH PLAQUE: ICD-10-CM

## 2025-08-20 DIAGNOSIS — E78.00 PURE HYPERCHOLESTEROLEMIA: ICD-10-CM

## 2025-08-20 PROCEDURE — 99214 OFFICE O/P EST MOD 30 MIN: CPT | Performed by: STUDENT IN AN ORGANIZED HEALTH CARE EDUCATION/TRAINING PROGRAM

## 2025-08-20 PROCEDURE — 93000 ELECTROCARDIOGRAM COMPLETE: CPT | Performed by: STUDENT IN AN ORGANIZED HEALTH CARE EDUCATION/TRAINING PROGRAM

## 2025-08-20 RX ORDER — LOSARTAN POTASSIUM 50 MG/1
50 TABLET ORAL DAILY
Qty: 90 TABLET | Refills: 1 | Status: SHIPPED | OUTPATIENT
Start: 2025-08-20